# Patient Record
Sex: FEMALE | Race: WHITE | Employment: STUDENT
[De-identification: names, ages, dates, MRNs, and addresses within clinical notes are randomized per-mention and may not be internally consistent; named-entity substitution may affect disease eponyms.]

---

## 2018-08-03 ENCOUNTER — HOSPITAL ENCOUNTER (OUTPATIENT)
Dept: MRI IMAGING | Facility: CLINIC | Age: 18
Discharge: HOME | End: 2018-08-05
Payer: COMMERCIAL

## 2018-08-03 DIAGNOSIS — R35.0 FREQUENCY OF MICTURITION: ICD-10-CM

## 2018-08-03 DIAGNOSIS — R39.15 URGENCY OF URINATION: ICD-10-CM

## 2018-08-03 PROCEDURE — A9579 GAD-BASE MR CONTRAST NOS,1ML: HCPCS | Performed by: UROLOGY

## 2018-08-03 PROCEDURE — 72157 MRI CHEST SPINE W/O & W/DYE: CPT

## 2018-08-03 PROCEDURE — 70553 MRI BRAIN STEM W/O & W/DYE: CPT

## 2018-08-03 PROCEDURE — 6360000004 HC RX CONTRAST MEDICATION: Performed by: UROLOGY

## 2018-08-03 RX ADMIN — GADOTERIDOL 15 ML: 279.3 INJECTION, SOLUTION INTRAVENOUS at 15:22

## 2018-08-10 ENCOUNTER — HOSPITAL ENCOUNTER (OUTPATIENT)
Age: 18
Setting detail: OUTPATIENT SURGERY
Discharge: HOME | End: 2018-08-10
Attending: UROLOGY | Admitting: UROLOGY
Payer: COMMERCIAL

## 2018-08-10 VITALS
DIASTOLIC BLOOD PRESSURE: 56 MMHG | RESPIRATION RATE: 16 BRPM | SYSTOLIC BLOOD PRESSURE: 97 MMHG | OXYGEN SATURATION: 100 % | WEIGHT: 194 LBS | BODY MASS INDEX: 32.32 KG/M2 | HEART RATE: 78 BPM | HEIGHT: 65 IN | TEMPERATURE: 98.1 F

## 2018-08-10 PROCEDURE — 7100000040 HC SPAR PHASE II RECOVERY - FIRST 15 MIN: Performed by: UROLOGY

## 2018-08-10 PROCEDURE — 3600000003 HC SURGERY LEVEL 3 BASE: Performed by: UROLOGY

## 2018-08-10 PROCEDURE — 3600000013 HC SURGERY LEVEL 3 ADDTL 15MIN: Performed by: UROLOGY

## 2018-08-10 PROCEDURE — 2709999900 HC NON-CHARGEABLE SUPPLY: Performed by: UROLOGY

## 2018-08-10 PROCEDURE — C1758 CATHETER, URETERAL: HCPCS | Performed by: UROLOGY

## 2018-08-10 RX ORDER — ZOLPIDEM TARTRATE 10 MG/1
TABLET ORAL NIGHTLY PRN
COMMUNITY

## 2018-08-10 RX ORDER — BUPROPION HYDROCHLORIDE 150 MG/1
150 TABLET ORAL EVERY MORNING
COMMUNITY
End: 2019-04-09

## 2018-08-10 RX ORDER — PAROXETINE HYDROCHLORIDE 20 MG/1
20 TABLET, FILM COATED ORAL EVERY MORNING
COMMUNITY
End: 2019-04-09

## 2018-08-10 RX ORDER — DOCUSATE SODIUM 100 MG/1
100 CAPSULE, LIQUID FILLED ORAL DAILY
Qty: 30 CAPSULE | Refills: 11 | Status: ON HOLD | OUTPATIENT
Start: 2018-08-10 | End: 2019-05-02

## 2018-08-10 RX ORDER — FERROUS SULFATE 325(65) MG
325 TABLET ORAL 2 TIMES DAILY
COMMUNITY
End: 2019-04-09

## 2018-08-10 RX ORDER — OXYBUTYNIN CHLORIDE 10 MG/1
10 TABLET, EXTENDED RELEASE ORAL DAILY
Qty: 30 TABLET | Refills: 11 | Status: SHIPPED | OUTPATIENT
Start: 2018-08-10 | End: 2019-04-09

## 2018-08-10 RX ORDER — LITHIUM CARBONATE 150 MG/1
150 CAPSULE ORAL 2 TIMES DAILY WITH MEALS
COMMUNITY
End: 2019-04-09

## 2018-08-10 ASSESSMENT — PAIN DESCRIPTION - PAIN TYPE: TYPE: ACUTE PAIN

## 2018-08-10 ASSESSMENT — PAIN - FUNCTIONAL ASSESSMENT: PAIN_FUNCTIONAL_ASSESSMENT: 0-10

## 2018-08-10 NOTE — H&P
Concern    Not on file     Social History Narrative    No narrative on file       Family History:    Family History   Problem Relation Age of Onset    Other Mother         lime desease    Cancer Mother         skin    Depression Father     Stroke Father        REVIEW OF SYSTEMS:    Constitutional: negative  Eyes: negative  Respiratory: negative  Cardiovascular: negative  Gastrointestinal: negative  Genitourinary: see HPI  Musculoskeletal: negative  Skin: negative   Neurological: negative  Hematological/Lymphatic: negative  Psychological: negative    Physical Exam:      This a 21 y.o. female   Patient Vitals for the past 24 hrs:   Height Weight   08/10/18 1209 5' 5\" (1.651 m) 194 lb (88 kg)     Constitutional: Patient in no acute distress. Neuro: Alert and oriented to person, place and time. Psych: mood and affect normal  Lungs: Respiratory effort is normal  Cardiovascular: Normal peripheral pulses  Abdomen: Soft, non-tender, non-distended      LABS:   No results for input(s): WBC, HGB, HCT, MCV, PLT in the last 72 hours. No results for input(s): NA, K, CL, CO2, PHOS, BUN, CREATININE in the last 72 hours. Invalid input(s): CA    Additional Lab/culture results:    Urinalysis: No results for input(s): COLORU, PHUR, LABCAST, WBCUA, RBCUA, MUCUS, TRICHOMONAS, YEAST, BACTERIA, CLARITYU, SPECGRAV, LEUKOCYTESUR, UROBILINOGEN, Fatmata Budd in the last 72 hours. Invalid input(s): NITRATE, GLUCOSEUKETONESUAMORPHOUS     -----------------------------------------------------------------  Imaging Results:    Imaging was independently reviewed and confirmed with report. Assessment and Plan   Impression:      21year old female with urgency, frequency and urge incontinence of unknown etiology. She has no history of UTIs.     Plan:     Urodynamics  Cystoscopy

## 2018-09-21 ENCOUNTER — HOSPITAL ENCOUNTER (OUTPATIENT)
Age: 18
Discharge: HOME | End: 2018-09-21
Payer: COMMERCIAL

## 2018-09-21 LAB
ALBUMIN SERPL-MCNC: 4.2 G/DL (ref 3.5–5.2)
ALBUMIN/GLOBULIN RATIO: 1.2 (ref 1–2.5)
ALP BLD-CCNC: 140 U/L (ref 35–104)
ALT SERPL-CCNC: 12 U/L (ref 5–33)
ANION GAP SERPL CALCULATED.3IONS-SCNC: 15 MMOL/L (ref 9–17)
AST SERPL-CCNC: 20 U/L
BILIRUB SERPL-MCNC: 0.7 MG/DL (ref 0.3–1.2)
BUN BLDV-MCNC: 8 MG/DL (ref 6–20)
BUN/CREAT BLD: ABNORMAL (ref 9–20)
CALCIUM SERPL-MCNC: 9.3 MG/DL (ref 8.6–10.4)
CHLORIDE BLD-SCNC: 104 MMOL/L (ref 98–107)
CO2: 22 MMOL/L (ref 20–31)
CREAT SERPL-MCNC: 0.54 MG/DL (ref 0.5–0.9)
GFR AFRICAN AMERICAN: >60 ML/MIN
GFR NON-AFRICAN AMERICAN: >60 ML/MIN
GFR SERPL CREATININE-BSD FRML MDRD: ABNORMAL ML/MIN/{1.73_M2}
GFR SERPL CREATININE-BSD FRML MDRD: ABNORMAL ML/MIN/{1.73_M2}
GLUCOSE BLD-MCNC: 85 MG/DL (ref 70–99)
IRON SATURATION: 15 % (ref 20–55)
IRON: 55 UG/DL (ref 37–145)
LITHIUM DATE LAST DOSE: ABNORMAL
LITHIUM DOSE AMOUNT: ABNORMAL
LITHIUM DOSE TIME: ABNORMAL
LITHIUM LEVEL: 0.2 MMOL/L (ref 0.6–1.2)
POTASSIUM SERPL-SCNC: 3.8 MMOL/L (ref 3.7–5.3)
SODIUM BLD-SCNC: 141 MMOL/L (ref 135–144)
T3 FREE: 3.11 PG/ML (ref 2.02–4.43)
THYROXINE, FREE: 0.66 NG/DL (ref 0.93–1.7)
TOTAL IRON BINDING CAPACITY: 357 UG/DL (ref 250–450)
TOTAL PROTEIN: 7.6 G/DL (ref 6.4–8.3)
TSH SERPL DL<=0.05 MIU/L-ACNC: 2.09 MIU/L (ref 0.3–5)
UNSATURATED IRON BINDING CAPACITY: 302 UG/DL (ref 112–347)

## 2018-09-21 PROCEDURE — 84481 FREE ASSAY (FT-3): CPT

## 2018-09-21 PROCEDURE — 80178 ASSAY OF LITHIUM: CPT

## 2018-09-21 PROCEDURE — 36415 COLL VENOUS BLD VENIPUNCTURE: CPT

## 2018-09-21 PROCEDURE — 83550 IRON BINDING TEST: CPT

## 2018-09-21 PROCEDURE — 83540 ASSAY OF IRON: CPT

## 2018-09-21 PROCEDURE — 80053 COMPREHEN METABOLIC PANEL: CPT

## 2018-09-21 PROCEDURE — 84443 ASSAY THYROID STIM HORMONE: CPT

## 2018-09-21 PROCEDURE — 84439 ASSAY OF FREE THYROXINE: CPT

## 2018-11-05 ENCOUNTER — OFFICE VISIT (OUTPATIENT)
Dept: NEUROLOGY | Age: 18
End: 2018-11-05
Payer: COMMERCIAL

## 2018-11-05 VITALS
DIASTOLIC BLOOD PRESSURE: 67 MMHG | BODY MASS INDEX: 34.02 KG/M2 | SYSTOLIC BLOOD PRESSURE: 112 MMHG | HEIGHT: 65 IN | WEIGHT: 204.2 LBS | HEART RATE: 66 BPM

## 2018-11-05 DIAGNOSIS — N39.41 URGE INCONTINENCE: ICD-10-CM

## 2018-11-05 DIAGNOSIS — R20.0 NUMBNESS: Primary | ICD-10-CM

## 2018-11-05 PROCEDURE — 99204 OFFICE O/P NEW MOD 45 MIN: CPT | Performed by: NURSE PRACTITIONER

## 2018-11-05 NOTE — PROGRESS NOTES
Head:  Normocephalic, no signs of trauma   Eyes:  Conjunctiva/corneas clear;  eyelids intact   Ears:  Normal external ear and canals   Nose: Nares normal, mucosa normal, no drainage    Throat: Lips and tongue normal; teeth normal;  gums normal   Neck: Supple, intact flexion, extension and rotation;   trachea midline;  no adenopathy;   thyroid: not enlarged;   no carotid pulse abnormality   Back:   Symmetric, no curvature, ROM adequate   Lungs:   Respirations unlabored   Heart:  Regular rate and rhythm           Extremities: Extremities normal, no cyanosis, no edema   Pulses: Symmetric over head and neck   Skin: Skin color, texture normal, no rashes, no lesions             Neurological Examination    Neurologic Exam     Mental Status   Oriented to person, place, and time. Attention: normal. Concentration: normal.   Speech: speech is normal   Level of consciousness: alert  Normal comprehension. Cranial Nerves     CN II   Visual fields full to confrontation. CN III, IV, VI   Pupils are equal, round, and reactive to light. Extraocular motions are normal.   Right pupil: Shape: regular. Reactivity: brisk. Left pupil: Shape: regular. Reactivity: brisk. Nystagmus: none   Diplopia: none  Upgaze: normal  Downgaze: normal  Conjugate gaze: present    CN V   Facial sensation intact. CN VII   Facial expression full, symmetric. CN VIII   CN VIII normal.     CN IX, X   CN IX normal.     CN XI   CN XI normal.     CN XII   CN XII normal.     Motor Exam   Muscle bulk: normal  Overall muscle tone: normal  Right arm tone: normal  Left arm tone: normal  Right arm pronator drift: absent  Left arm pronator drift: absent  Right leg tone: normal  Left leg tone: normal    Strength   Strength 5/5 throughout.      Sensory Exam   Light touch normal.   Vibration normal.   Pinprick normal.       Distinct sensory level unable to be obtained over the spine with vibration and touch     Gait, Coordination, and Reflexes Gait  Gait: normal    Coordination   Finger to nose coordination: normal    Tremor   Resting tremor: absent    Reflexes   Right brachioradialis: 2+  Left brachioradialis: 2+  Right biceps: 2+  Left biceps: 2+  Right triceps: 2+  Left triceps: 2+  Right patellar: 2+  Left patellar: 2+  Right achilles: 2+  Left achilles: 2+  Right plantar: normal  Left plantar: normal            Assessment/Plan: :    Thank you very much for the kind referral of Inderjit Buitrago for evaluation of her neurologic problems. Because of the complexity of her symptoms including visual difficulties, sensory abnormalities, and urge incontinence, we will rule out demyelinative disease by obtaining an MRI of her cervical spine. The MRI of her brain and thoracic spine only showed disc disease at T10-11 and T11-12. We will also obtain an MRI of her lumbar spine to determine if there is any compressive lesion that may be causing her incontinence as well as neuropathic symptoms in her lower extremities. She has been evaluated by neuro-ophthalmology and we will obtain their records. Patient will return for reevaluation in approximately 3 weeks. If her testing is normal, we will consider EMG/NCV studies.   Once again, thank you for allowing us to participate in the neurologic care of Inderjit Buitrago    Signed: Marija Roth, NICOLASA

## 2018-11-05 NOTE — LETTER
is no significant weakness in her extremities. Patient recently had an evaluation by urology with the cystoscope, showing no significant abnormality. She also had an evaluation by neuro ophthalmology who found no abnormality. An MRI of her brain revealed a posterior left frontal venous angioma, without any other significant abnormalities. An MRI of her thoracic spine showed small central disc protrusion at T10-11 and disc bulging at T11-12. MRI images were reviewed with the patient and her mother during the visit. At one point, there was a question of demyelinative disease, which was ruled out in her brain and her thoracic spine.           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[facility])     Urinary frequency     Urinary urgency        Past Surgical History:   Procedure Laterality Date    [place] OFFICE/OUTPT VISIT,PROCEDURE ONLY N/A 8/10/2018    VIDEO Alexa Trevino, CYSTOSCOPY performed by Bacilio Coreas MD at [address] [place] [address] Drive TYMPANOSTOMY TUBE PLACEMENT Bilateral     WISDOM TOOTH EXTRACTION         Family History   Problem Relation Age of Onset    Other Mother         lime desease    Cancer Mother         skin    Depression Father     Stroke Father     Heart Disease Father     Hypertension Father     Cancer Paternal Uncle         Breast and lung cancer    Heart Disease Maternal Grandmother     Heart Disease Maternal Grandfather     Heart Disease Paternal Grandmother     Cancer Paternal Grandmother         cancer       Social History     Social History    Marital status: Single     Spouse name: N/A    Number of children: N/A    Years of education: N/A     Social History Main Topics    Smoking status: Never Smoker    Smokeless tobacco: Never Used    Alcohol use Yes      Comment: occasional    Drug use: No    Sexual activity: Not Asked     Other Topics Concern    None     Social History Narrative    None       Current Outpatient Prescriptions Left leg tone: normal    Strength   Strength 5/5 throughout. Sensory Exam   Light touch normal.   Vibration normal.   Pinprick normal.       Distinct sensory level unable to be obtained over the spine with vibration and touch     Gait, Coordination, and Reflexes     Gait  Gait: normal    Coordination   Finger to nose coordination: normal    Tremor   Resting tremor: absent    Reflexes   Right brachioradialis: 2+  Left brachioradialis: 2+  Right biceps: 2+  Left biceps: 2+  Right triceps: 2+  Left triceps: 2+  Right patellar: 2+  Left patellar: 2+  Right achilles: 2+  Left achilles: 2+  Right plantar: normal  Left plantar: normal            Assessment/Plan: :    Thank you very much for the kind referral of Divine Rinaldi for evaluation of her neurologic problems. Because of the complexity of her symptoms including visual difficulties, sensory abnormalities, and urge incontinence, we will rule out demyelinative disease by obtaining an MRI of her cervical spine. The MRI of her brain and thoracic spine only showed disc disease at T1011 and T11-12. We will also obtain an MRI of her lumbar spine to determine if there is any compressive lesion that may be causing her incontinence as well as neuropathic symptoms in her lower extremities. She has been evaluated by neuro-ophthalmology and we will obtain their records. Patient will return for reevaluation in approximately 3 weeks. If her testing is normal, we will consider EMG/NCV studies. Once again, thank you for allowing us to participate in the neurologic care of Divine Rinaldi    Signed: Janet Sanchez CNP          If you have questions, please do not hesitate to call me. I look forward to following Eduardo Gallegos along with you.     Sincerely,        ARTHUR Ornelas CNP

## 2018-11-06 ENCOUNTER — TELEPHONE (OUTPATIENT)
Dept: NEUROLOGY | Age: 18
End: 2018-11-06

## 2018-11-09 ENCOUNTER — HOSPITAL ENCOUNTER (OUTPATIENT)
Dept: MRI IMAGING | Facility: CLINIC | Age: 18
Discharge: HOME | End: 2018-11-11
Payer: COMMERCIAL

## 2018-11-09 DIAGNOSIS — N39.41 URGE INCONTINENCE: ICD-10-CM

## 2018-11-09 DIAGNOSIS — R20.0 NUMBNESS: ICD-10-CM

## 2018-11-09 PROCEDURE — 72141 MRI NECK SPINE W/O DYE: CPT

## 2018-11-09 PROCEDURE — 72148 MRI LUMBAR SPINE W/O DYE: CPT

## 2018-11-19 ENCOUNTER — HOSPITAL ENCOUNTER (EMERGENCY)
Age: 18
Discharge: HOME | End: 2018-11-19
Attending: EMERGENCY MEDICINE
Payer: COMMERCIAL

## 2018-11-19 VITALS
BODY MASS INDEX: 34.16 KG/M2 | HEART RATE: 82 BPM | OXYGEN SATURATION: 99 % | RESPIRATION RATE: 18 BRPM | DIASTOLIC BLOOD PRESSURE: 69 MMHG | SYSTOLIC BLOOD PRESSURE: 122 MMHG | HEIGHT: 65 IN | WEIGHT: 205 LBS | TEMPERATURE: 97.9 F

## 2018-11-19 DIAGNOSIS — R32 URINARY INCONTINENCE, UNSPECIFIED TYPE: Primary | ICD-10-CM

## 2018-11-19 DIAGNOSIS — F45.0 SOMATIZATION DISORDER: ICD-10-CM

## 2018-11-19 DIAGNOSIS — R20.0 NUMBNESS: ICD-10-CM

## 2018-11-19 LAB
-: NORMAL
ABSOLUTE EOS #: 0.2 K/UL (ref 0–0.4)
ABSOLUTE IMMATURE GRANULOCYTE: NORMAL K/UL (ref 0–0.3)
ABSOLUTE LYMPH #: 2.4 K/UL (ref 1.2–5.2)
ABSOLUTE MONO #: 0.6 K/UL (ref 0.1–1.3)
ALBUMIN SERPL-MCNC: 3.9 G/DL (ref 3.5–5.2)
ALBUMIN/GLOBULIN RATIO: ABNORMAL (ref 1–2.5)
ALP BLD-CCNC: 126 U/L (ref 35–104)
ALT SERPL-CCNC: 15 U/L (ref 5–33)
ANION GAP SERPL CALCULATED.3IONS-SCNC: 11 MMOL/L (ref 9–17)
AST SERPL-CCNC: 20 U/L
BASOPHILS # BLD: 1 % (ref 0–2)
BASOPHILS ABSOLUTE: 0.1 K/UL (ref 0–0.2)
BILIRUB SERPL-MCNC: 0.56 MG/DL (ref 0.3–1.2)
BILIRUBIN URINE: NEGATIVE
BUN BLDV-MCNC: 15 MG/DL (ref 6–20)
BUN/CREAT BLD: ABNORMAL (ref 9–20)
CALCIUM SERPL-MCNC: 9.5 MG/DL (ref 8.6–10.4)
CHLORIDE BLD-SCNC: 106 MMOL/L (ref 98–107)
CO2: 23 MMOL/L (ref 20–31)
COLOR: YELLOW
COMMENT UA: NORMAL
CREAT SERPL-MCNC: 0.44 MG/DL (ref 0.5–0.9)
DIFFERENTIAL TYPE: NORMAL
EOSINOPHILS RELATIVE PERCENT: 3 % (ref 0–4)
GFR AFRICAN AMERICAN: >60 ML/MIN
GFR NON-AFRICAN AMERICAN: >60 ML/MIN
GFR SERPL CREATININE-BSD FRML MDRD: ABNORMAL ML/MIN/{1.73_M2}
GFR SERPL CREATININE-BSD FRML MDRD: ABNORMAL ML/MIN/{1.73_M2}
GLUCOSE BLD-MCNC: 95 MG/DL (ref 70–99)
GLUCOSE URINE: NEGATIVE
HCG(URINE) PREGNANCY TEST: NEGATIVE
HCT VFR BLD CALC: 43.4 % (ref 36–46)
HEMOGLOBIN: 13.8 G/DL (ref 12–16)
IMMATURE GRANULOCYTES: NORMAL %
KETONES, URINE: NEGATIVE
LEUKOCYTE ESTERASE, URINE: NEGATIVE
LYMPHOCYTES # BLD: 28 % (ref 25–45)
MCH RBC QN AUTO: 28.5 PG (ref 26–34)
MCHC RBC AUTO-ENTMCNC: 31.8 G/DL (ref 31–37)
MCV RBC AUTO: 89.8 FL (ref 80–100)
MONOCYTES # BLD: 7 % (ref 2–8)
NITRITE, URINE: NEGATIVE
NRBC AUTOMATED: NORMAL PER 100 WBC
PDW BLD-RTO: 14.3 % (ref 11.5–14.9)
PH UA: 5 (ref 5–8)
PLATELET # BLD: 311 K/UL (ref 150–450)
PLATELET ESTIMATE: NORMAL
PMV BLD AUTO: 8.7 FL (ref 6–12)
POTASSIUM SERPL-SCNC: 4 MMOL/L (ref 3.7–5.3)
PROTEIN UA: NEGATIVE
RBC # BLD: 4.84 M/UL (ref 4–5.2)
RBC # BLD: NORMAL 10*6/UL
REASON FOR REJECTION: NORMAL
SEG NEUTROPHILS: 61 % (ref 34–64)
SEGMENTED NEUTROPHILS ABSOLUTE COUNT: 5.5 K/UL (ref 1.3–9.1)
SODIUM BLD-SCNC: 140 MMOL/L (ref 135–144)
SPECIFIC GRAVITY UA: 1.02 (ref 1–1.03)
TOTAL PROTEIN: 7.3 G/DL (ref 6.4–8.3)
TURBIDITY: CLEAR
URINE HGB: NEGATIVE
UROBILINOGEN, URINE: NORMAL
WBC # BLD: 8.9 K/UL (ref 4.5–13.5)
WBC # BLD: NORMAL 10*3/UL
ZZ NTE CLEAN UP: ORDERED TEST: NORMAL
ZZ NTE WITH NAME CLEAN UP: SPECIMEN SOURCE: NORMAL

## 2018-11-19 PROCEDURE — 36415 COLL VENOUS BLD VENIPUNCTURE: CPT

## 2018-11-19 PROCEDURE — 99284 EMERGENCY DEPT VISIT MOD MDM: CPT

## 2018-11-19 PROCEDURE — 80053 COMPREHEN METABOLIC PANEL: CPT

## 2018-11-19 PROCEDURE — 81003 URINALYSIS AUTO W/O SCOPE: CPT

## 2018-11-19 PROCEDURE — 84703 CHORIONIC GONADOTROPIN ASSAY: CPT

## 2018-11-19 PROCEDURE — 85025 COMPLETE CBC W/AUTO DIFF WBC: CPT

## 2018-11-19 ASSESSMENT — PAIN SCALES - GENERAL
PAINLEVEL_OUTOF10: 9
PAINLEVEL_OUTOF10: 0

## 2018-11-19 ASSESSMENT — PAIN DESCRIPTION - LOCATION: LOCATION: BACK

## 2018-11-19 ASSESSMENT — PAIN DESCRIPTION - DESCRIPTORS: DESCRIPTORS: ACHING

## 2018-11-19 ASSESSMENT — PAIN DESCRIPTION - PAIN TYPE: TYPE: CHRONIC PAIN

## 2018-11-19 NOTE — ED PROVIDER NOTES
eMERGENCY dEPARTMENT eNCOUnter    Pt Name: Delaney Barraza  MRN: [id number]  [place] 2000  Date of evaluation: 11/19/18  CHIEF COMPLAINT       Chief Complaint   Patient presents with    Back Pain     chronic- pt has neurologist    Numbness     bilat feet- onset    Incontinence     HISTORY OF PRESENT ILLNESS   HPI   The patient is a 17-year-old white female with a long history of depression and anxiety, who presents to the emergency room with multiple chronic complaints including back pain, urinary incontinence, and multiple neurologic symptoms. The patient has seen a neurologist, had MRIs of her back, seen a urologist, with cystoscopy performed, seen ophthalmology for visual complaints, etc., etc.  The patient was seeing her primary care provider today and complained of foot numbness. She was then sent to the emergency room. The patient has had bilateral foot numbness for several months. She is negative for ataxia. The mother also mentioned that the patient has been seen for occasional lymphadenopathy in her neck, but no biopsies have been performed. She is negative for dysuria, GI symptoms, URI symptoms, fever chills, chest pain, shortness of breath. Patient becomes tearful when discussing her symptoms. The mother states she has been worked up for multiple sclerosis, and that workup has been negative.     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

## 2018-12-14 ENCOUNTER — HOSPITAL ENCOUNTER (OUTPATIENT)
Dept: ULTRASOUND IMAGING | Age: 18
Discharge: HOME | End: 2018-12-16
Payer: COMMERCIAL

## 2018-12-14 DIAGNOSIS — R22.1 NECK MASS: ICD-10-CM

## 2018-12-14 PROCEDURE — 76536 US EXAM OF HEAD AND NECK: CPT

## 2019-01-10 ENCOUNTER — OFFICE VISIT (OUTPATIENT)
Dept: FAMILY MEDICINE CLINIC | Age: 19
End: 2019-01-10
Payer: COMMERCIAL

## 2019-01-10 VITALS
DIASTOLIC BLOOD PRESSURE: 62 MMHG | HEIGHT: 66 IN | RESPIRATION RATE: 16 BRPM | OXYGEN SATURATION: 99 % | SYSTOLIC BLOOD PRESSURE: 96 MMHG | TEMPERATURE: 98.4 F | WEIGHT: 209 LBS | HEART RATE: 80 BPM | BODY MASS INDEX: 33.59 KG/M2

## 2019-01-10 DIAGNOSIS — N39.41 URGE INCONTINENCE: Primary | ICD-10-CM

## 2019-01-10 DIAGNOSIS — E03.9 HYPOTHYROIDISM, UNSPECIFIED TYPE: ICD-10-CM

## 2019-01-10 DIAGNOSIS — G47.00 INSOMNIA, UNSPECIFIED TYPE: ICD-10-CM

## 2019-01-10 DIAGNOSIS — F33.2 SEVERE EPISODE OF RECURRENT MAJOR DEPRESSIVE DISORDER, WITHOUT PSYCHOTIC FEATURES (HCC): ICD-10-CM

## 2019-01-10 DIAGNOSIS — R05.9 COUGH: ICD-10-CM

## 2019-01-10 PROCEDURE — 99204 OFFICE O/P NEW MOD 45 MIN: CPT | Performed by: FAMILY MEDICINE

## 2019-01-10 RX ORDER — LITHIUM CARBONATE 450 MG
450 TABLET, EXTENDED RELEASE ORAL 2 TIMES DAILY
COMMUNITY

## 2019-01-10 RX ORDER — LEVOTHYROXINE AND LIOTHYRONINE 9.5; 2.25 UG/1; UG/1
15 TABLET ORAL DAILY
COMMUNITY
End: 2019-02-21 | Stop reason: SDUPTHER

## 2019-01-10 RX ORDER — ATOMOXETINE 40 MG/1
40 CAPSULE ORAL DAILY
COMMUNITY
End: 2019-02-21 | Stop reason: ALTCHOICE

## 2019-01-10 ASSESSMENT — ENCOUNTER SYMPTOMS
ABDOMINAL DISTENTION: 0
RHINORRHEA: 0
NAUSEA: 0
CONSTIPATION: 0
CHEST TIGHTNESS: 0
ABDOMINAL PAIN: 0
BACK PAIN: 0
SHORTNESS OF BREATH: 0
COUGH: 1
DIARRHEA: 0
VOMITING: 0
SORE THROAT: 0

## 2019-01-10 ASSESSMENT — PATIENT HEALTH QUESTIONNAIRE - PHQ9
SUM OF ALL RESPONSES TO PHQ9 QUESTIONS 1 & 2: 1
2. FEELING DOWN, DEPRESSED OR HOPELESS: 1
SUM OF ALL RESPONSES TO PHQ QUESTIONS 1-9: 1
1. LITTLE INTEREST OR PLEASURE IN DOING THINGS: 0
SUM OF ALL RESPONSES TO PHQ QUESTIONS 1-9: 1

## 2019-01-16 ENCOUNTER — HOSPITAL ENCOUNTER (OUTPATIENT)
Age: 19
Discharge: HOME | End: 2019-01-16
Payer: COMMERCIAL

## 2019-01-16 DIAGNOSIS — R05.9 COUGH: ICD-10-CM

## 2019-01-16 DIAGNOSIS — E03.9 HYPOTHYROIDISM, UNSPECIFIED TYPE: ICD-10-CM

## 2019-01-16 LAB
ALBUMIN SERPL-MCNC: 3.9 G/DL (ref 3.5–5.2)
ALBUMIN/GLOBULIN RATIO: 1.1 (ref 1–2.5)
ALP BLD-CCNC: 108 U/L (ref 35–104)
ALT SERPL-CCNC: 10 U/L (ref 5–33)
ANION GAP SERPL CALCULATED.3IONS-SCNC: 16 MMOL/L (ref 9–17)
AST SERPL-CCNC: 15 U/L
BILIRUB SERPL-MCNC: 1.14 MG/DL (ref 0.3–1.2)
BUN BLDV-MCNC: 10 MG/DL (ref 6–20)
BUN/CREAT BLD: ABNORMAL (ref 9–20)
CALCIUM SERPL-MCNC: 9.4 MG/DL (ref 8.6–10.4)
CHLORIDE BLD-SCNC: 104 MMOL/L (ref 98–107)
CO2: 20 MMOL/L (ref 20–31)
CREAT SERPL-MCNC: 0.56 MG/DL (ref 0.5–0.9)
GFR AFRICAN AMERICAN: >60 ML/MIN
GFR NON-AFRICAN AMERICAN: >60 ML/MIN
GFR SERPL CREATININE-BSD FRML MDRD: ABNORMAL ML/MIN/{1.73_M2}
GFR SERPL CREATININE-BSD FRML MDRD: ABNORMAL ML/MIN/{1.73_M2}
GLUCOSE BLD-MCNC: 80 MG/DL (ref 70–99)
HCT VFR BLD CALC: 43 % (ref 36.3–47.1)
HEMOGLOBIN: 12.9 G/DL (ref 11.9–15.1)
LITHIUM DATE LAST DOSE: NORMAL
LITHIUM DOSE AMOUNT: NORMAL
LITHIUM DOSE TIME: NORMAL
LITHIUM LEVEL: 0.9 MMOL/L (ref 0.6–1.2)
MCH RBC QN AUTO: 28.8 PG (ref 25.2–33.5)
MCHC RBC AUTO-ENTMCNC: 30 G/DL (ref 28.4–34.8)
MCV RBC AUTO: 96 FL (ref 82.6–102.9)
NRBC AUTOMATED: 0 PER 100 WBC
PDW BLD-RTO: 13.8 % (ref 11.8–14.4)
PLATELET # BLD: 323 K/UL (ref 138–453)
PMV BLD AUTO: 11.8 FL (ref 8.1–13.5)
POTASSIUM SERPL-SCNC: 4.3 MMOL/L (ref 3.7–5.3)
RBC # BLD: 4.48 M/UL (ref 3.95–5.11)
SODIUM BLD-SCNC: 140 MMOL/L (ref 135–144)
T3 FREE: 2.87 PG/ML (ref 2.02–4.43)
THYROXINE, FREE: 1.08 NG/DL (ref 0.93–1.7)
TOTAL PROTEIN: 7.3 G/DL (ref 6.4–8.3)
TSH SERPL DL<=0.05 MIU/L-ACNC: 4.96 MIU/L (ref 0.3–5)
WBC # BLD: 8.9 K/UL (ref 4.5–13.5)

## 2019-01-16 PROCEDURE — 84443 ASSAY THYROID STIM HORMONE: CPT

## 2019-01-16 PROCEDURE — 84439 ASSAY OF FREE THYROXINE: CPT

## 2019-01-16 PROCEDURE — 85027 COMPLETE CBC AUTOMATED: CPT

## 2019-01-16 PROCEDURE — 84481 FREE ASSAY (FT-3): CPT

## 2019-01-16 PROCEDURE — 80053 COMPREHEN METABOLIC PANEL: CPT

## 2019-01-16 PROCEDURE — 80178 ASSAY OF LITHIUM: CPT

## 2019-01-16 PROCEDURE — 36415 COLL VENOUS BLD VENIPUNCTURE: CPT

## 2019-02-14 ENCOUNTER — HOSPITAL ENCOUNTER (OUTPATIENT)
Dept: GENERAL RADIOLOGY | Facility: CLINIC | Age: 19
Discharge: HOME | End: 2019-02-16
Payer: COMMERCIAL

## 2019-02-14 ENCOUNTER — HOSPITAL ENCOUNTER (OUTPATIENT)
Facility: CLINIC | Age: 19
Discharge: HOME | End: 2019-02-16
Payer: COMMERCIAL

## 2019-02-14 DIAGNOSIS — R05.9 COUGH: ICD-10-CM

## 2019-02-14 PROCEDURE — 71046 X-RAY EXAM CHEST 2 VIEWS: CPT

## 2019-02-21 ENCOUNTER — OFFICE VISIT (OUTPATIENT)
Dept: FAMILY MEDICINE CLINIC | Age: 19
End: 2019-02-21
Payer: COMMERCIAL

## 2019-02-21 VITALS
BODY MASS INDEX: 34.74 KG/M2 | TEMPERATURE: 98.2 F | HEART RATE: 96 BPM | WEIGHT: 212 LBS | OXYGEN SATURATION: 98 % | SYSTOLIC BLOOD PRESSURE: 102 MMHG | RESPIRATION RATE: 16 BRPM | DIASTOLIC BLOOD PRESSURE: 64 MMHG

## 2019-02-21 DIAGNOSIS — F33.2 SEVERE EPISODE OF RECURRENT MAJOR DEPRESSIVE DISORDER, WITHOUT PSYCHOTIC FEATURES (HCC): ICD-10-CM

## 2019-02-21 DIAGNOSIS — G47.00 INSOMNIA, UNSPECIFIED TYPE: ICD-10-CM

## 2019-02-21 DIAGNOSIS — R05.9 COUGH: Primary | ICD-10-CM

## 2019-02-21 DIAGNOSIS — E03.9 HYPOTHYROIDISM, UNSPECIFIED TYPE: ICD-10-CM

## 2019-02-21 DIAGNOSIS — N39.41 URGE INCONTINENCE: ICD-10-CM

## 2019-02-21 PROBLEM — R20.0 NUMBNESS: Status: RESOLVED | Noted: 2018-11-05 | Resolved: 2019-02-21

## 2019-02-21 PROCEDURE — 99213 OFFICE O/P EST LOW 20 MIN: CPT | Performed by: FAMILY MEDICINE

## 2019-02-21 RX ORDER — LEVOTHYROXINE AND LIOTHYRONINE 9.5; 2.25 UG/1; UG/1
15 TABLET ORAL DAILY
Qty: 30 TABLET | Refills: 11 | Status: SHIPPED | OUTPATIENT
Start: 2019-02-21 | End: 2020-01-08 | Stop reason: SDUPTHER

## 2019-02-21 RX ORDER — METHYLPHENIDATE HYDROCHLORIDE 36 MG/1
1 TABLET ORAL DAILY
Refills: 0 | COMMUNITY
Start: 2019-01-24

## 2019-02-21 ASSESSMENT — ENCOUNTER SYMPTOMS
DIARRHEA: 0
BACK PAIN: 0
RHINORRHEA: 0
ABDOMINAL DISTENTION: 0
CHEST TIGHTNESS: 0
COUGH: 1
VOMITING: 0
NAUSEA: 0
SORE THROAT: 0
CONSTIPATION: 0
SHORTNESS OF BREATH: 0
ABDOMINAL PAIN: 0

## 2019-02-26 ENCOUNTER — OFFICE VISIT (OUTPATIENT)
Dept: FAMILY MEDICINE CLINIC | Age: 19
End: 2019-02-26
Payer: COMMERCIAL

## 2019-02-26 VITALS
TEMPERATURE: 97.1 F | RESPIRATION RATE: 16 BRPM | SYSTOLIC BLOOD PRESSURE: 112 MMHG | DIASTOLIC BLOOD PRESSURE: 68 MMHG | WEIGHT: 210 LBS | BODY MASS INDEX: 34.41 KG/M2 | HEART RATE: 66 BPM | OXYGEN SATURATION: 99 %

## 2019-02-26 DIAGNOSIS — L30.9 DERMATITIS: Primary | ICD-10-CM

## 2019-02-26 PROCEDURE — 99213 OFFICE O/P EST LOW 20 MIN: CPT | Performed by: FAMILY MEDICINE

## 2019-02-26 RX ORDER — TRIAMCINOLONE ACETONIDE 1 MG/G
CREAM TOPICAL 2 TIMES DAILY
Qty: 45 G | Refills: 0 | Status: SHIPPED | OUTPATIENT
Start: 2019-02-26 | End: 2019-04-09

## 2019-02-26 ASSESSMENT — ENCOUNTER SYMPTOMS
SORE THROAT: 0
RHINORRHEA: 0
CHEST TIGHTNESS: 0
DIARRHEA: 0
VOMITING: 0
SHORTNESS OF BREATH: 0
ABDOMINAL DISTENTION: 0
CONSTIPATION: 0
ABDOMINAL PAIN: 0
COUGH: 0
NAUSEA: 0
BACK PAIN: 0

## 2019-03-04 ENCOUNTER — TELEPHONE (OUTPATIENT)
Dept: FAMILY MEDICINE CLINIC | Age: 19
End: 2019-03-04

## 2019-03-18 ENCOUNTER — HOSPITAL ENCOUNTER (OUTPATIENT)
Age: 19
Discharge: HOME | End: 2019-03-20
Payer: COMMERCIAL

## 2019-03-18 ENCOUNTER — HOSPITAL ENCOUNTER (OUTPATIENT)
Dept: GENERAL RADIOLOGY | Age: 19
Discharge: HOME | End: 2019-03-20
Payer: COMMERCIAL

## 2019-03-18 DIAGNOSIS — Z30.46 CHECKING SUBDERMAL CONTRACEPTIVE: ICD-10-CM

## 2019-03-18 PROCEDURE — 73060 X-RAY EXAM OF HUMERUS: CPT

## 2019-04-02 ENCOUNTER — TELEPHONE (OUTPATIENT)
Dept: FAMILY MEDICINE CLINIC | Age: 19
End: 2019-04-02

## 2019-04-02 NOTE — TELEPHONE ENCOUNTER
Patient's mom called in asking if results from the pulmonary test had been received as patient or her have not heard anything back yet. Please call Ignacia back to advise. Thank You.

## 2019-04-09 ENCOUNTER — TELEPHONE (OUTPATIENT)
Dept: OBGYN CLINIC | Age: 19
End: 2019-04-09

## 2019-04-09 ENCOUNTER — OFFICE VISIT (OUTPATIENT)
Dept: OBGYN CLINIC | Age: 19
End: 2019-04-09
Payer: COMMERCIAL

## 2019-04-09 VITALS
SYSTOLIC BLOOD PRESSURE: 125 MMHG | HEIGHT: 65 IN | HEART RATE: 80 BPM | DIASTOLIC BLOOD PRESSURE: 62 MMHG | BODY MASS INDEX: 35.65 KG/M2 | WEIGHT: 214 LBS

## 2019-04-09 DIAGNOSIS — N92.0 MENORRHAGIA WITH REGULAR CYCLE: Primary | ICD-10-CM

## 2019-04-09 DIAGNOSIS — Z30.017 NEXPLANON INSERTION: ICD-10-CM

## 2019-04-09 DIAGNOSIS — Z30.46 NEXPLANON REMOVAL: ICD-10-CM

## 2019-04-09 PROCEDURE — 11983 REMOVE/INSERT DRUG IMPLANT: CPT | Performed by: OBSTETRICS & GYNECOLOGY

## 2019-04-09 PROCEDURE — 99203 OFFICE O/P NEW LOW 30 MIN: CPT | Performed by: OBSTETRICS & GYNECOLOGY

## 2019-04-09 ASSESSMENT — ENCOUNTER SYMPTOMS
BACK PAIN: 0
COUGH: 0
ABDOMINAL PAIN: 0
SHORTNESS OF BREATH: 0
CONSTIPATION: 0

## 2019-04-09 NOTE — PROGRESS NOTES
[facility]YSIC[facility]ANS  [facility] - [address] [place] [address]  Dept: [phone]  Dept Fax: [phone]    19    Chief Complaint   Patient presents with    Establish Care    Procedure     Nexplanon removal and insertion of new one       Rosanne Cortés 18 y.o. is here for Nexplanon removal and an insertion of a new one. She was seen by her PCP who was unable to find the Nexplanon and so referred her here. She had the Nexplanon placed 2016. She says that she wasn't having any periods for the first 2 years, but this last year she has been having monthly periods. At first they were really light, but have become heavier. She was initially started on the Nexplanon for heavy periods with lots of clotting. She is sexually active and uses barrier protection as well. Review of Systems   Constitutional: Negative for chills and fever. HENT: Negative for congestion and hearing loss. Respiratory: Negative for cough and shortness of breath. Cardiovascular: Negative for chest pain and palpitations. Gastrointestinal: Negative for abdominal pain and constipation. Endocrine: Negative for cold intolerance and heat intolerance. Genitourinary: Negative for dyspareunia and vaginal discharge. Musculoskeletal: Negative for back pain. Neurological: Negative for dizziness and light-headedness. Psychiatric/Behavioral: The patient is not nervous/anxious. Gynecologic History  No LMP recorded.   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 Per Dr. Greg Fothergill. 0    thyroid (NP THYROID) 15 MG tablet Take 1 tablet by mouth daily 30 tablet 11    Mirabegron ER (MYRBETRIQ) 50 MG TB24 Take 1 tablet by mouth daily      lithium (ESKALITH) 450 MG extended release tablet Take 450 mg by mouth 2 times daily      etonogestrel (NEXPLANON) 68 MG implant 68 mg by Subdermal route once      zolpidem (AMBIEN) 10 MG tablet Take by mouth nightly as needed for Sleep. [facility] NONFORMULARY 1 Dose by Implant route      docusate sodium (COLACE) 100 MG capsule Take 1 capsule by mouth daily 30 capsule 11     No current facility-administered medications for this visit.       Social History     Socioeconomic History    Marital status: Single     Spouse name: Not on file    Number of children: Not on file    Years of education: Not on file    Highest education level: Not on file   Occupational History    Not on file   Social Needs    Financial resource strain: Not on file    Food insecurity:     Worry: Not on file     Inability: Not on file    Transportation needs:     Medical: Not on file     Non-medical: Not on file   Tobacco Use    Smoking status: Never Smoker    Smokeless tobacco: Never Used   Substance and Sexual Activity    Alcohol use: Yes     Comment: occasional    Drug use: No    Sexual activity: Yes     Partners: Male   Lifestyle    Physical activity:     Days per week: Not on file     Minutes per session: Not on file    Stress: Not on file   Relationships    Social connections:     Talks on phone: Not on file     Gets together: Not on file     Attends [employer] service: Not on file     Active member of club or organization: Not on file     Attends meetings of clubs or organizations: Not on file Relationship status: Not on file    Intimate partner violence:     Fear of current or ex partner: Not on file     Emotionally abused: Not on file     Physically abused: Not on file     Forced sexual activity: Not on file   Other Topics Concern    Not on file   Social History Narrative    Not on file     Family History   Problem Relation Age of Onset    Other Mother         lime desease    Cancer Mother         skin    Depression Father     Stroke Father     Heart Disease Father     Hypertension Father     Heart Disease Maternal Grandmother     Breast Cancer Maternal Grandmother     Lung Cancer Maternal Grandmother     Heart Disease Maternal Grandfather     Heart Disease Paternal Grandmother     Cancer Paternal Grandmother         cancer       Physical exam Physical Exam   Constitutional: She is oriented to person, place, and time. She appears well-developed and well-nourished. HENT:   Head: Normocephalic and atraumatic. Eyes: Pupils are equal, round, and reactive to light. EOM are normal.   Neck: Normal range of motion. No tracheal deviation present. Neurological: She is alert and oriented to person, place, and time. Skin: Skin is warm and dry. Psychiatric: She has a normal mood and affect. Her behavior is normal. Judgment and thought content normal.       Assessment/Plan  1. Menorrhagia with regular cycle  - Pt has Nexplanon in place and likes that it makes her periods lighter and for awhile made her have amenorrhea. Pt would like Nexplanon removed and a new one inserted. PCP attempted to find Nexplanon in arm and was unable to palpate, so had to have an Xray done that visualized it. 2. Nexplanon insertion  - Able to place new Nexplanon    3. Nexplanon removal  - Unable to remove Nexplanon in the office. Discussed this with pt and will schedule pt for removal in the OR under Xray guidance with the C arm at [facility]. Discussed risks of surgery in normal detailed fashion.       Patient was seen with total face to face time of 30 minutes. More than 50% of this visit was on counseling and education regarding her    Diagnosis Orders   1. Menorrhagia with regular cycle     2. Nexplanon insertion     3. Nexplanon removal      and her options. She was also counseled on her preventative health maintenance recommendations and follow-up.     Eduardo Harper MD  [address]

## 2019-04-19 ENCOUNTER — TELEPHONE (OUTPATIENT)
Dept: OBGYN CLINIC | Age: 19
End: 2019-04-19

## 2019-04-19 NOTE — TELEPHONE ENCOUNTER
Severiano Sage with pre-certification at [facility] called stating that due to [facility] she will not be able to have her surgery at that facility. She will need to have her surgery at a [facility]. Severiano Sage said if there are any questions you can call her.

## 2019-04-22 NOTE — TELEPHONE ENCOUNTER
Surgery is now scheduled for [facility] on 5/2/2019 at 10:25 am, pt to arrive [facility] at 8:30am, NPO as stated before, pt to call with any questions

## 2019-05-02 ENCOUNTER — APPOINTMENT (OUTPATIENT)
Dept: GENERAL RADIOLOGY | Age: 19
End: 2019-05-02
Attending: OBSTETRICS & GYNECOLOGY
Payer: COMMERCIAL

## 2019-05-02 ENCOUNTER — HOSPITAL ENCOUNTER (OUTPATIENT)
Age: 19
Setting detail: OUTPATIENT SURGERY
Discharge: HOME | End: 2019-05-02
Attending: OBSTETRICS & GYNECOLOGY | Admitting: OBSTETRICS & GYNECOLOGY
Payer: COMMERCIAL

## 2019-05-02 ENCOUNTER — ANESTHESIA EVENT (OUTPATIENT)
Dept: OPERATING ROOM | Age: 19
End: 2019-05-02
Payer: COMMERCIAL

## 2019-05-02 ENCOUNTER — ANESTHESIA (OUTPATIENT)
Dept: OPERATING ROOM | Age: 19
End: 2019-05-02
Payer: COMMERCIAL

## 2019-05-02 VITALS
HEIGHT: 65 IN | OXYGEN SATURATION: 99 % | BODY MASS INDEX: 34.56 KG/M2 | SYSTOLIC BLOOD PRESSURE: 105 MMHG | HEART RATE: 65 BPM | WEIGHT: 207.45 LBS | RESPIRATION RATE: 18 BRPM | TEMPERATURE: 97 F | DIASTOLIC BLOOD PRESSURE: 61 MMHG

## 2019-05-02 VITALS — OXYGEN SATURATION: 100 % | SYSTOLIC BLOOD PRESSURE: 93 MMHG | DIASTOLIC BLOOD PRESSURE: 43 MMHG

## 2019-05-02 PROBLEM — Z30.46 NEXPLANON REMOVAL: Status: ACTIVE | Noted: 2019-05-02

## 2019-05-02 LAB
ABO/RH: NORMAL
ANTIBODY SCREEN: NEGATIVE
ARM BAND NUMBER: NORMAL
EXPIRATION DATE: NORMAL
HCG, PREGNANCY URINE (POC): NEGATIVE
HCT VFR BLD CALC: 43.1 % (ref 36.3–47.1)
HEMOGLOBIN: 13.5 G/DL (ref 11.9–15.1)
MCH RBC QN AUTO: 28.9 PG (ref 25.2–33.5)
MCHC RBC AUTO-ENTMCNC: 31.3 G/DL (ref 28.4–34.8)
MCV RBC AUTO: 92.3 FL (ref 82.6–102.9)
NRBC AUTOMATED: 0 PER 100 WBC
PDW BLD-RTO: 12.9 % (ref 11.8–14.4)
PLATELET # BLD: 317 K/UL (ref 138–453)
PMV BLD AUTO: 10.9 FL (ref 8.1–13.5)
RBC # BLD: 4.67 M/UL (ref 3.95–5.11)
WBC # BLD: 9.9 K/UL (ref 4.5–13.5)

## 2019-05-02 PROCEDURE — 86900 BLOOD TYPING SEROLOGIC ABO: CPT

## 2019-05-02 PROCEDURE — 7100000040 HC SPAR PHASE II RECOVERY - FIRST 15 MIN: Performed by: OBSTETRICS & GYNECOLOGY

## 2019-05-02 PROCEDURE — 3700000001 HC ADD 15 MINUTES (ANESTHESIA): Performed by: OBSTETRICS & GYNECOLOGY

## 2019-05-02 PROCEDURE — 3209999900 FLUORO FOR SURGICAL PROCEDURES

## 2019-05-02 PROCEDURE — 6360000002 HC RX W HCPCS: Performed by: NURSE ANESTHETIST, CERTIFIED REGISTERED

## 2019-05-02 PROCEDURE — 11982 REMOVE DRUG IMPLANT DEVICE: CPT | Performed by: OBSTETRICS & GYNECOLOGY

## 2019-05-02 PROCEDURE — 84703 CHORIONIC GONADOTROPIN ASSAY: CPT

## 2019-05-02 PROCEDURE — 85027 COMPLETE CBC AUTOMATED: CPT

## 2019-05-02 PROCEDURE — 3700000000 HC ANESTHESIA ATTENDED CARE: Performed by: OBSTETRICS & GYNECOLOGY

## 2019-05-02 PROCEDURE — 2500000003 HC RX 250 WO HCPCS: Performed by: OBSTETRICS & GYNECOLOGY

## 2019-05-02 PROCEDURE — 3600000003 HC SURGERY LEVEL 3 BASE: Performed by: OBSTETRICS & GYNECOLOGY

## 2019-05-02 PROCEDURE — 86850 RBC ANTIBODY SCREEN: CPT

## 2019-05-02 PROCEDURE — 7100000041 HC SPAR PHASE II RECOVERY - ADDTL 15 MIN: Performed by: OBSTETRICS & GYNECOLOGY

## 2019-05-02 PROCEDURE — 2500000003 HC RX 250 WO HCPCS: Performed by: NURSE ANESTHETIST, CERTIFIED REGISTERED

## 2019-05-02 PROCEDURE — 2580000003 HC RX 258: Performed by: ANESTHESIOLOGY

## 2019-05-02 PROCEDURE — 86901 BLOOD TYPING SEROLOGIC RH(D): CPT

## 2019-05-02 PROCEDURE — 2709999900 HC NON-CHARGEABLE SUPPLY: Performed by: OBSTETRICS & GYNECOLOGY

## 2019-05-02 PROCEDURE — 6370000000 HC RX 637 (ALT 250 FOR IP): Performed by: OBSTETRICS & GYNECOLOGY

## 2019-05-02 PROCEDURE — 2580000003 HC RX 258: Performed by: OBSTETRICS & GYNECOLOGY

## 2019-05-02 PROCEDURE — 3600000013 HC SURGERY LEVEL 3 ADDTL 15MIN: Performed by: OBSTETRICS & GYNECOLOGY

## 2019-05-02 RX ORDER — LIDOCAINE HYDROCHLORIDE 10 MG/ML
INJECTION, SOLUTION EPIDURAL; INFILTRATION; INTRACAUDAL; PERINEURAL PRN
Status: DISCONTINUED | OUTPATIENT
Start: 2019-05-02 | End: 2019-05-02 | Stop reason: SDUPTHER

## 2019-05-02 RX ORDER — PROPOFOL 10 MG/ML
INJECTION, EMULSION INTRAVENOUS CONTINUOUS PRN
Status: DISCONTINUED | OUTPATIENT
Start: 2019-05-02 | End: 2019-05-02 | Stop reason: SDUPTHER

## 2019-05-02 RX ORDER — PROPOFOL 10 MG/ML
INJECTION, EMULSION INTRAVENOUS PRN
Status: DISCONTINUED | OUTPATIENT
Start: 2019-05-02 | End: 2019-05-02 | Stop reason: SDUPTHER

## 2019-05-02 RX ORDER — SODIUM CHLORIDE 0.9 % (FLUSH) 0.9 %
10 SYRINGE (ML) INJECTION EVERY 12 HOURS SCHEDULED
Status: DISCONTINUED | OUTPATIENT
Start: 2019-05-02 | End: 2019-05-02 | Stop reason: HOSPADM

## 2019-05-02 RX ORDER — SODIUM CHLORIDE 0.9 % (FLUSH) 0.9 %
10 SYRINGE (ML) INJECTION PRN
Status: DISCONTINUED | OUTPATIENT
Start: 2019-05-02 | End: 2019-05-02 | Stop reason: HOSPADM

## 2019-05-02 RX ORDER — SODIUM CHLORIDE, SODIUM LACTATE, POTASSIUM CHLORIDE, CALCIUM CHLORIDE 600; 310; 30; 20 MG/100ML; MG/100ML; MG/100ML; MG/100ML
INJECTION, SOLUTION INTRAVENOUS CONTINUOUS
Status: DISCONTINUED | OUTPATIENT
Start: 2019-05-02 | End: 2019-05-02 | Stop reason: HOSPADM

## 2019-05-02 RX ORDER — FENTANYL CITRATE 50 UG/ML
25 INJECTION, SOLUTION INTRAMUSCULAR; INTRAVENOUS EVERY 5 MIN PRN
Status: DISCONTINUED | OUTPATIENT
Start: 2019-05-02 | End: 2019-05-02 | Stop reason: HOSPADM

## 2019-05-02 RX ORDER — FENTANYL CITRATE 50 UG/ML
INJECTION, SOLUTION INTRAMUSCULAR; INTRAVENOUS PRN
Status: DISCONTINUED | OUTPATIENT
Start: 2019-05-02 | End: 2019-05-02 | Stop reason: SDUPTHER

## 2019-05-02 RX ORDER — FENTANYL CITRATE 50 UG/ML
50 INJECTION, SOLUTION INTRAMUSCULAR; INTRAVENOUS EVERY 5 MIN PRN
Status: DISCONTINUED | OUTPATIENT
Start: 2019-05-02 | End: 2019-05-02 | Stop reason: HOSPADM

## 2019-05-02 RX ORDER — DESVENLAFAXINE 50 MG/1
TABLET, EXTENDED RELEASE ORAL
Refills: 4 | COMMUNITY
Start: 2019-04-12

## 2019-05-02 RX ORDER — MIDAZOLAM HYDROCHLORIDE 1 MG/ML
INJECTION INTRAMUSCULAR; INTRAVENOUS PRN
Status: DISCONTINUED | OUTPATIENT
Start: 2019-05-02 | End: 2019-05-02 | Stop reason: SDUPTHER

## 2019-05-02 RX ORDER — BUPIVACAINE HYDROCHLORIDE AND EPINEPHRINE 5; 5 MG/ML; UG/ML
INJECTION, SOLUTION EPIDURAL; INTRACAUDAL; PERINEURAL PRN
Status: DISCONTINUED | OUTPATIENT
Start: 2019-05-02 | End: 2019-05-02 | Stop reason: ALTCHOICE

## 2019-05-02 RX ORDER — MAGNESIUM HYDROXIDE 1200 MG/15ML
LIQUID ORAL CONTINUOUS PRN
Status: COMPLETED | OUTPATIENT
Start: 2019-05-02 | End: 2019-05-02

## 2019-05-02 RX ORDER — SCOLOPAMINE TRANSDERMAL SYSTEM 1 MG/1
1 PATCH, EXTENDED RELEASE TRANSDERMAL ONCE
Status: DISCONTINUED | OUTPATIENT
Start: 2019-05-02 | End: 2019-05-02 | Stop reason: HOSPADM

## 2019-05-02 RX ADMIN — PROPOFOL 125 MCG/KG/MIN: 10 INJECTION, EMULSION INTRAVENOUS at 10:40

## 2019-05-02 RX ADMIN — PROPOFOL 30 MG: 10 INJECTION, EMULSION INTRAVENOUS at 10:53

## 2019-05-02 RX ADMIN — PROPOFOL 30 MG: 10 INJECTION, EMULSION INTRAVENOUS at 10:47

## 2019-05-02 RX ADMIN — SODIUM CHLORIDE, POTASSIUM CHLORIDE, SODIUM LACTATE AND CALCIUM CHLORIDE: 600; 310; 30; 20 INJECTION, SOLUTION INTRAVENOUS at 10:00

## 2019-05-02 RX ADMIN — PROPOFOL 50 MG: 10 INJECTION, EMULSION INTRAVENOUS at 10:44

## 2019-05-02 RX ADMIN — FENTANYL CITRATE 25 MCG: 50 INJECTION INTRAMUSCULAR; INTRAVENOUS at 10:41

## 2019-05-02 RX ADMIN — PROPOFOL 50 MG: 10 INJECTION, EMULSION INTRAVENOUS at 10:41

## 2019-05-02 RX ADMIN — MIDAZOLAM HYDROCHLORIDE 2 MG: 1 INJECTION, SOLUTION INTRAMUSCULAR; INTRAVENOUS at 10:39

## 2019-05-02 RX ADMIN — PROPOFOL 50 MG: 10 INJECTION, EMULSION INTRAVENOUS at 10:50

## 2019-05-02 RX ADMIN — FENTANYL CITRATE 50 MCG: 50 INJECTION INTRAMUSCULAR; INTRAVENOUS at 11:34

## 2019-05-02 RX ADMIN — LIDOCAINE HYDROCHLORIDE 50 MG: 10 INJECTION, SOLUTION EPIDURAL; INFILTRATION; INTRACAUDAL; PERINEURAL at 10:41

## 2019-05-02 ASSESSMENT — PULMONARY FUNCTION TESTS
PIF_VALUE: 1

## 2019-05-02 ASSESSMENT — PAIN SCALES - GENERAL
PAINLEVEL_OUTOF10: 0

## 2019-05-02 ASSESSMENT — PAIN - FUNCTIONAL ASSESSMENT: PAIN_FUNCTIONAL_ASSESSMENT: 0-10

## 2019-05-02 NOTE — ANESTHESIA POSTPROCEDURE EVALUATION
Department of Anesthesiology  Postprocedure Note    Patient: Keli Menjivar  MRN: [id number]  YOB: 2000  Date of evaluation: 5/2/2019  Time:  11:39 AM     Procedure Summary     Date:  05/02/19 Room / Location:  [facility] OR  / [facility] OR    Anesthesia Start:  1036 Anesthesia Stop:  1135    Procedure:  ATTEMPTED EXCISION NEXPLANON FROM ARM  C-ARM  (DR YOUSSEF TO ASSIST) (Left ) Diagnosis:  (MENORRHAGIA)    Surgeon:  Clifton Henry MD Responsible Provider:  Jason Munoz MD    Anesthesia Type:  MAC ASA Status:  2          Anesthesia Type: MAC    Kendrick Phase I:      Kendrick Phase II: Kendrick Score: 9    Last vitals: Reviewed and per EMR flowsheets.        Anesthesia Post Evaluation    Patient location during evaluation: PACU  Patient participation: complete - patient participated  Level of consciousness: awake and alert  Pain score: 2  Airway patency: patent  Nausea & Vomiting: no nausea and no vomiting  Complications: no  Cardiovascular status: hemodynamically stable  Respiratory status: acceptable, nasal cannula and room air  Hydration status: stable

## 2019-05-02 NOTE — OP NOTE
Operative Note  Department of Obstetrics and Gynecology  [facility]       Patient: Darren Arzate   : 2000  MRN: [id number]       Acct: [de-identified]   PCP: Ezekiel Singh MD  Date of Procedure: 19    Pre-operative Diagnosis: 19 y.o. female [place]. Unsuccessful Nexplanon removal in offive    Post-operative Diagnosis: Unsuccessful Nexplanon removal    Procedure: Attempted Nexplanon removal under Xray guidance    Surgeon: Renan Juarez MD    Assistants: Rosalie Harris DO; Maurie Castleman, DO PGY-3    Anesthesia: Gentle sedation    Indications: Patient had a Nexplanon placed 2016 for heavy periods. She presented to the office for removal of the device and the attempt was unsuccessful due to the deep nature of the device in the arm. The patient was brought to the hospital to attempt removal with the use of XRay and sedation. Procedure Details: The patient was seen in the pre-op room. The risks, benefits, complications, treatment options, and expected outcomes were discussed with the patient. The patient concurred with the proposed plan, giving informed consent. The patient was taken to the Operating Room and identified as Darren Arzate and the procedure was verified. A Time Out was held and the above information confirmed. After administration of gentle sedation, the patient was placed in the dorsolithotomy. The patient was prepped and draped in the usual sterile fashion. The Nexplanon was identified with the C-arm Xray machine and a 0.5 cm incision was made with a scalpel in the skin of the forearm. The device was noted to be near the humerus and approximately 3 cm deep to the skin. Multiple attempts with hemostats and forceps were made to grasp the device.   Due to the deep location of the device and it's close proximity to muscle and bone the decision was made to abort the procedure as more harm could be done to the patient's arm than good if more attempts were made to remove the device. The skin was then closed with skin glue. Instrument, sponge, and needle counts were correct at the conclusion of the case.         Estimated Blood Loss: Minimalml  Drains:  None  Total IV Fluids: Minimalml  Urine output: Patient voided prior to procedure  Specimens:  none  Instrument and Sponge Count: Correct x2  Complications: none  Condition: stable, transfer to post anesthesia recovery    Shannon Navarrete MD  5/2/2019, 11:37 AM

## 2019-05-02 NOTE — H&P
OB/GYN Pre-Op H&P  [facility]    Patient Name: Doris Brandon     Patient : 2000  [facility]  Admission Date/Time: 2019  8:16 AM  Primary Care Physician: Jony Marquis MD  MRN: [id number]    Date: 2019  Time: 10:05 AM    The patient was seen in pre-op holding. She is here for scheduled surgery: Removal of Nexplanon. Attempt was made to remove Nexplanon (left upper extremity) however unsuccessful in outpatient setting. A Nexplanon was placed on 19 for Menorrhagia. The procedure risks and complications were reviewed. The labs, Consent were reviewed and updated. The patient was counseled on the possibility of  the need of a second surgery. The patient voiced understanding and had all of her questions answered. The possibility of incomplete removal of abnormal tissue was discussed. OBSTETRICAL HISTORY:   OB History    Para Term  AB Living   0 0 0 0 0 0   SAB TAB Ectopic Molar Multiple Live Births   0 0 0 0 0 0       PAST MEDICAL HISTORY:   has a past medical history of Depression, Hypothyroidism, Insomnia, Platelet storage pool deficiency [facility]), and Urinary urgency. PAST SURGICAL HISTORY:   has a past surgical history that includes Tympanostomy tube placement (Bilateral); Jenkins tooth extraction; and pr office/outpt visit,procedure only (N/A, 8/10/2018).     ALLERGIES:  Allergies as of 2019 - Review Complete 2019   Allergen Reaction Noted    Keflex [cephalexin] Rash 08/10/2018       MEDICATIONS:  Current Facility-Administered Medications   Medication Dose Route Frequency Provider Last Rate Last Dose    scopolamine (TRANSDERM-SCOP) transdermal patch 1 patch  1 patch Transdermal Once Alexx Torres MD   1 patch at 19 0904    sodium chloride flush 0.9 % injection 10 mL  10 mL Intravenous 2 times per day Sylvia Marcum MD        sodium chloride flush 0.9 % injection 10 mL  10 mL Intravenous PRN Sylvia Marcum, MD        lactated ringers infusion   Intravenous Continuous Simba Crane  mL/hr at 05/02/19 1000      fentaNYL (SUBLIMAZE) injection 25 mcg  25 mcg Intravenous Q5 Min PRN Simba Crane MD        fentaNYL (SUBLIMAZE) injection 50 mcg  50 mcg Intravenous Q5 Min PRN Simba Craen MD           FAMILY HISTORY:  family history includes Breast Cancer in her maternal grandmother; Cancer in her mother and paternal grandmother; Depression in her father; Heart Disease in her father, maternal grandfather, maternal grandmother, and paternal grandmother; Hypertension in her father; Ermalinda Caitlyn in her maternal grandmother; Other in her mother; Stroke in her father. SOCIAL HISTORY:   reports that she has never smoked. She has never used smokeless tobacco. She reports that she drinks alcohol. She reports that she does not use drugs. VITALS:  Vitals:    05/02/19 0833 05/02/19 0859   BP:  116/64   Pulse:  56   Resp:  16   Temp:  98.2 °F (36.8 °C)   TempSrc:  Temporal   SpO2:  100%   Weight: 207 lb 7.3 oz (94.1 kg)    Height: 5' 5\" (1.651 m)                                                                                                                                PHYSICAL EXAM:     Constitutional: She is oriented to person, place, and time. She appears well-developed and well-nourished. HENT:   Head: Normocephalic and atraumatic. Eyes: EOM are normal. Pupils are equal, round, and reactive to light. Neck: Normal range of motion. No tracheal deviation present. Cardiovascular: Normal rate and regular rhythm.    Pulmonary/Chest: Effort normal and breath sounds normal.   Abdomen: soft, nontender  Musculoskeletal: Normal range of motion. She exhibits no deformity. Neurological: She is alert and oriented to person, place, and time. Skin: Skin is warm and dry. Psychiatric: She has a normal mood and affect.  Her behavior is normal. Judgment and thought content normal.        LAB RESULTS:  Admission on 05/02/2019

## 2019-05-02 NOTE — ANESTHESIA PRE PROCEDURE
Department of Anesthesiology  Preprocedure Note       Name:  Keli Menjivar   Age:  19 y.o.  :  2000                                          MRN:  [id number]         Date:  2019      Surgeon: Lynn Carranza):  Clifton Henry MD    Procedure: EXCISION NEXPLANON FROM ARM  C-ARM  (DR YOUSSEF TO ASSIST) (Left )    Medications prior to admission:   Prior to Admission medications    Medication Sig Start Date End Date Taking? Authorizing Provider   desvenlafaxine succinate (PRISTIQ) 50 MG TB24 extended release tablet TAKE 1 TABLET BY MOUTH ONE TIME A DAY 19  Yes Historical Provider, MD   methylphenidate (CONCERTA) 36 MG extended release tablet Take 1 tablet by mouth daily. Per Dr. Jayjay Monge. 19  Yes Historical Provider, MD   thyroid (NP THYROID) 15 MG tablet Take 1 tablet by mouth daily 19  Yes Tyrone Olvera MD   Mirabegron ER (MYRBETRIQ) 50 MG TB24 Take 1 tablet by mouth daily   Yes Historical Provider, MD   lithium (ESKALITH) 450 MG extended release tablet Take 450 mg by mouth 2 times daily   Yes Historical Provider, MD   zolpidem (AMBIEN) 10 MG tablet Take by mouth nightly as needed for Sleep. .   Yes Historical Provider, MD   etonogestrel (NEXPLANON) 68 MG implant 68 mg by Subdermal route once    Historical Provider, MD   NONFORMULARY 1 Dose by Implant route    Historical Provider, MD       Current medications:    Current Facility-Administered Medications   Medication Dose Route Frequency Provider Last Rate Last Dose    scopolamine (TRANSDERM-SCOP) transdermal patch 1 patch  1 patch Transdermal Once Clifton Henry MD   1 patch at 19 0904       Allergies:     Allergies   Allergen Reactions    Keflex [Cephalexin] Rash       Problem List:    Patient Active Problem List   Diagnosis Code    Urge incontinence N39.41    Depression F32.9    Insomnia G47.00    Hypothyroidism E03.9       Past Medical History:        Diagnosis Date    Depression     Hypothyroidism     Insomnia     Platelet storage pool deficiency [facility])     Urinary urgency        Past Surgical History:        Procedure Laterality Date    [place] OFFICE/OUTPT VISIT,PROCEDURE ONLY N/A 8/10/2018    VIDEO URODYAMICS, CYSTOSCOPY performed by Eloina Knight MD [address] Bilateral     WISDOM TOOTH EXTRACTION         Social History:    Social History     Tobacco Use    Smoking status: Never Smoker    Smokeless tobacco: Never Used   Substance Use Topics    Alcohol use: Yes     Comment: occasional                                Counseling given: Not Answered      Vital Signs (Current):   Vitals:    05/02/19 0833 05/02/19 0859   BP:  116/64   Pulse:  56   Resp:  16   Temp:  98.2 °F (36.8 °C)   TempSrc:  Temporal   SpO2:  100%   Weight: 207 lb 7.3 oz (94.1 kg)    Height: 5' 5\" (1.651 m)                                               BP Readings from Last 3 Encounters:   05/02/19 116/64   04/09/19 125/62   02/26/19 112/68       NPO Status: Time of last liquid consumption: 2200                        Time of last solid consumption: 2000                        Date of last liquid consumption: 05/01/19                        Date of last solid food consumption: 05/01/19    BMI:   Wt Readings from Last 3 Encounters:   05/02/19 207 lb 7.3 oz (94.1 kg)   04/09/19 214 lb (97.1 kg)   02/26/19 210 lb (95.3 kg)     Body mass index is 34.52 kg/m².     CBC:   Lab Results   Component Value Date    WBC 9.9 05/02/2019    RBC 4.67 05/02/2019    HGB 13.5 05/02/2019    HCT 43.1 05/02/2019    MCV 92.3 05/02/2019    RDW 12.9 05/02/2019     05/02/2019       CMP:   Lab Results   Component Value Date     01/16/2019    K 4.3 01/16/2019     01/16/2019    CO2 20 01/16/2019    BUN 10 01/16/2019    CREATININE 0.56 01/16/2019    GFRAA >60 01/16/2019    LABGLOM >60 01/16/2019    GLUCOSE 80 01/16/2019    PROT 7.3 01/16/2019    CALCIUM 9.4 01/16/2019    BILITOT 1.14 01/16/2019    ALKPHOS 108 01/16/2019    AST 15 01/16/2019 ALT 10 01/16/2019       POC Tests: No results for input(s): POCGLU, POCNA, POCK, POCCL, POCBUN, POCHEMO, POCHCT in the last 72 hours. Coags: No results found for: PROTIME, INR, APTT    HCG (If Applicable):   Lab Results   Component Value Date    PREGTESTUR NEGATIVE 11/19/2018        ABGs: No results found for: PHART, PO2ART, IQB0RJM, OSM7CMJ, BEART, F3NSAILU     Type & Screen (If Applicable):  No results found for: [facility]    Anesthesia Evaluation  Patient summary reviewed no history of anesthetic complications:   Airway: Mallampati: II  TM distance: >3 FB   Neck ROM: full  Mouth opening: > = 3 FB Dental: normal exam         Pulmonary:Negative Pulmonary ROS and normal exam                               Cardiovascular:Negative CV ROS                      Neuro/Psych:   (+) psychiatric history:            GI/Hepatic/Renal:   (+) morbid obesity          Endo/Other:    (+) hypothyroidism::., .                 Abdominal:           Vascular: negative vascular ROS. Anesthesia Plan      MAC     ASA 2       Induction: intravenous. MIPS: Postoperative opioids intended. Anesthetic plan and risks discussed with patient. Plan discussed with CRNA.                   Renée Langford MD   5/2/2019

## 2019-05-08 ENCOUNTER — OFFICE VISIT (OUTPATIENT)
Dept: OBGYN CLINIC | Age: 19
End: 2019-05-08

## 2019-05-08 VITALS
BODY MASS INDEX: 35.65 KG/M2 | HEART RATE: 68 BPM | SYSTOLIC BLOOD PRESSURE: 125 MMHG | WEIGHT: 214 LBS | DIASTOLIC BLOOD PRESSURE: 86 MMHG | HEIGHT: 65 IN

## 2019-05-08 DIAGNOSIS — Z09 POSTOP CHECK: Primary | ICD-10-CM

## 2019-05-08 PROCEDURE — 99024 POSTOP FOLLOW-UP VISIT: CPT | Performed by: OBSTETRICS & GYNECOLOGY

## 2019-05-08 NOTE — PROGRESS NOTES
[facility]ANS  [facility] - [address] [phone]  Dept: [facility]  2019  1:25 PM      Rosanne Cortés  Procedure: Failed Nexplanon removal      Rosanne Cortés is a 19 y.o. female       The patient was seen, she denies any complaints. She denied any shortness of breath, chest pain or dizziness. She denied any nausea, vomiting, or diarrhea. There is no fever, chills, or rigors. The patient denies any vaginal bleeding, discharge or odor. All of her pre-operative complaints are now resolved. Blood pressure 125/86, pulse 68, height 5' 5\" (1.651 m), weight 214 lb (97.1 kg), last menstrual period 2019, not currently breastfeeding. Abdominal Exam: soft non-tender. Good bowel sounds. No guarding, rebound or rigidity. No costal vertebral angle tenderness bilateral. No hernias    Incision: healing well, no drainage, no erythema    Extremities: No edema or calf pain noted bilaterally. Results for orders placed or performed during the hospital encounter of 19   CBC   Result Value Ref Range    WBC 9.9 4.5 - 13.5 k/uL    RBC 4.67 3.95 - 5.11 m/uL    Hemoglobin 13.5 11.9 - 15.1 g/dL    Hematocrit 43.1 36.3 - 47.1 %    MCV 92.3 82.6 - 102.9 fL    MCH 28.9 25.2 - 33.5 pg    MCHC 31.3 28.4 - 34.8 g/dL    RDW 12.9 11.8 - 14.4 %    Platelets 818 407 - 578 k/uL    MPV 10.9 8.1 - 13.5 fL    NRBC Automated 0.0 0.0 per 100 WBC   POCT urine pregnancy   Result Value Ref Range    HCG, Pregnancy Urine (POC) NEGATIVE NEGATIVE   TYPE AND SCREEN   Result Value Ref Range    Expiration Date 2019     Arm Band Number [id number]     ABO/Rh A POSITIVE     Antibody Screen NEGATIVE            Assessment:      Diagnosis Orders   1.  Postop check          Patient Active Problem List    Diagnosis Date Noted    19 S/P Attempted Nexplanon removal 2019     Unable to retrieve nexplanon      Depression     Insomnia     Hypothyroidism     Urge incontinence 11/05/2018          POD# 13   Procedure: Failed nexplanon removal under Xray guidance   Stable      Plan:  Pt to continue with Nexplanon in her arm  No restrictions. Pt to follow up for annual exam after 19st birthday this summer.      Pamella Mittal MD

## 2019-06-03 ENCOUNTER — TELEPHONE (OUTPATIENT)
Dept: FAMILY MEDICINE CLINIC | Age: 19
End: 2019-06-03

## 2019-06-03 NOTE — TELEPHONE ENCOUNTER
Patient stopped in and form filled out, scanned into chart and given back to her along with her immunization record.

## 2019-06-03 NOTE — TELEPHONE ENCOUNTER
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 I did tell her that since the patient was seen in February of 2019 we will fill it out.  They will drop it off this afternoon and pick it up in the am.

## 2019-09-09 ENCOUNTER — TELEPHONE (OUTPATIENT)
Dept: FAMILY MEDICINE CLINIC | Age: 19
End: 2019-09-09

## 2019-09-09 DIAGNOSIS — Z20.2 STD EXPOSURE: Primary | ICD-10-CM

## 2019-09-10 ENCOUNTER — HOSPITAL ENCOUNTER (OUTPATIENT)
Age: 19
Discharge: HOME | End: 2019-09-10
Payer: COMMERCIAL

## 2019-09-10 DIAGNOSIS — A64 STD (FEMALE): ICD-10-CM

## 2019-09-10 DIAGNOSIS — A64 STD (FEMALE): Primary | ICD-10-CM

## 2019-09-10 PROCEDURE — 87591 N.GONORRHOEAE DNA AMP PROB: CPT

## 2019-09-10 PROCEDURE — 87491 CHLMYD TRACH DNA AMP PROBE: CPT

## 2019-09-11 LAB
C. TRACHOMATIS DNA ,URINE: ABNORMAL
N. GONORRHOEAE DNA, URINE: NEGATIVE
SPECIMEN DESCRIPTION: ABNORMAL

## 2019-09-11 RX ORDER — AZITHROMYCIN 500 MG/1
1000 TABLET, FILM COATED ORAL ONCE
Qty: 2 TABLET | Refills: 0 | Status: SHIPPED | OUTPATIENT
Start: 2019-09-11 | End: 2019-09-11

## 2020-01-09 RX ORDER — LEVOTHYROXINE AND LIOTHYRONINE 9.5; 2.25 UG/1; UG/1
15 TABLET ORAL DAILY
Qty: 30 TABLET | Refills: 11 | Status: SHIPPED | OUTPATIENT
Start: 2020-01-09

## 2020-02-20 ENCOUNTER — NURSE TRIAGE (OUTPATIENT)
Dept: OTHER | Facility: CLINIC | Age: 20
End: 2020-02-20

## 2020-03-18 ENCOUNTER — TELEPHONE (OUTPATIENT)
Dept: OBGYN CLINIC | Age: 20
End: 2020-03-18

## 2020-07-28 ENCOUNTER — TELEPHONE (OUTPATIENT)
Dept: OBGYN CLINIC | Age: 20
End: 2020-07-28

## 2020-07-28 NOTE — TELEPHONE ENCOUNTER
Pt's mom Ignacia called to schedule an appointment for Lakisha Lock to possibly get her 2nd nexplanon removed. They think it it is migrating towards her armpit and are concerned. I scheduled her for the next available apt on 8/28/20 but they would like a call if something opens up before then.

## 2020-08-27 ENCOUNTER — TELEPHONE (OUTPATIENT)
Dept: OBGYN CLINIC | Age: 20
End: 2020-08-27

## 2020-08-27 NOTE — TELEPHONE ENCOUNTER
Pt is scheduled for a possible Nexplanon removal 8/28/20220 but in May 2019 pt had a failed Nexplanon removal in the office and in surgery with xray guided. Inquiring as to what pt is coming for exactly.  Is it just for her annual ?

## 2020-08-28 ENCOUNTER — PROCEDURE VISIT (OUTPATIENT)
Dept: OBGYN CLINIC | Age: 20
End: 2020-08-28
Payer: COMMERCIAL

## 2020-08-28 VITALS
WEIGHT: 136 LBS | SYSTOLIC BLOOD PRESSURE: 118 MMHG | BODY MASS INDEX: 22.66 KG/M2 | DIASTOLIC BLOOD PRESSURE: 76 MMHG | HEART RATE: 88 BPM | HEIGHT: 65 IN

## 2020-08-28 PROCEDURE — 11976 REMOVE CONTRACEPTIVE CAPSULE: CPT | Performed by: OBSTETRICS & GYNECOLOGY

## 2020-08-28 NOTE — PROGRESS NOTES
[facility]YSICIANS  [facility] Aneta Snyder  [place] [address] [facility]  Dept: [phone]  2020      Chief Complaint   Patient presents with    Procedure     Nexplanon removal.  Pt says the old Nexplanon is moving and wants to try to have it removed today       Zachariah Frazier is a 24 yo female who presents for Nexplanon removal.  We had attempted to remove it in the OR, but were unsuccessful, but had placed a new one at that time. She was unable to feel the old Nexplanon previously, but recently lost almost 80 lbs and is now able to feel it in her arm. She would like to have the  one removed. Past Medical History:   Diagnosis Date    Depression     Hypothyroidism     Insomnia     Platelet storage pool deficiency [facility])     Urinary urgency          Past Surgical History:   Procedure Laterality Date    ARM SURGERY Left 2019    ATTEMPTED EXCISION [address] FROM ARM    EXCISION / BIOPSY SKIN LESION OF ARM Left 2019    ATTEMPTED EXCISION NEXPLANON FROM ARM  C-ARM  (DR YOUSSEF TO ASSIST) performed by Stephenie Dow MD at [address] [facility] OFFICE/OUTPT [address] [facility] N/A 8/10/2018    VIDEO Clovis Estrella, CYSTOSCOPY performed by Bob Figueroa MD at [address] [facility] Bilateral     WISDOM TOOTH EXTRACTION         Family History   Problem Relation Age of Onset    Other Mother         lime desease    Cancer Mother         skin    Depression Father     Stroke Father     Heart Disease Father     Hypertension Father     Heart Disease Maternal Grandmother     Breast Cancer Maternal Grandmother     Lung Cancer Maternal Grandmother     Heart Disease Maternal Grandfather     Heart Disease Paternal Grandmother     Cancer Paternal Grandmother         cancer       Social History     Tobacco Use    Smoking status: Never Smoker    Smokeless tobacco: Never Used   Substance Use Topics    Alcohol use: Yes     Comment: occasional    Drug use:  No Current Outpatient Medications on File Prior to Visit   Medication Sig Dispense Refill    etonogestrel (NEXPLANON) 68 MG implant 68 mg by Subdermal route once      zolpidem (AMBIEN) 10 MG tablet Take by mouth nightly as needed for Sleep. Brian Suzan thyroid (NP THYROID) 15 MG tablet Take 1 tablet by mouth daily (Patient not taking: Reported on 2020) 30 tablet 11    desvenlafaxine succinate (PRISTIQ) 50 MG TB24 extended release tablet TAKE 1 TABLET BY MOUTH ONE TIME A DAY  4    methylphenidate (CONCERTA) 36 MG extended release tablet Take 1 tablet by mouth daily. Per Dr. Rc Villeda. 0    Mirabegron ER (MYRBETRIQ) 50 MG TB24 Take 1 tablet by mouth daily      lithium (ESKALITH) 450 MG extended release tablet Take 450 mg by mouth 2 times daily      NONFORMULARY 1 Dose by Implant route       No current facility-administered medications on file prior to visit. Allergies as of 2020 - Review Complete 2019   Allergen Reaction Noted    Keflex [cephalexin] Rash 08/10/2018         OB History    Para Term  AB Living   0 0 0 0 0 0   SAB TAB Ectopic Molar Multiple Live Births   0 0 0 0 0 0         Blood pressure 118/76, pulse 88, height 5' 5\" (1.651 m), weight 136 lb (61.7 kg), not currently breastfeeding. PROCEDURE:  The patient was positioned comfortably on our procedure table. She was consented earlier in the appointment and the procedure risk and complications were reviewed. A sterile prep and drape was completed and a 1% xylocaine for local anesthetic was utilized, 2 ml. A scalpel was used to make a 0.5 cm incision. The device was removed, but it was difficult to extract. Skin glue was applied with a pressure wrap covering it. The patient tolerated the procedure well. Formal restrictions were discussed in detail. She is to notify our office if any swelling, redness, temperature, or limb restriction or numbness. Assessment:   Diagnosis Orders   1.  Encounter for Nexplanon removal       Patient Active Problem List    Diagnosis Date Noted    5/2/19 S/P Attempted Nexplanon removal 05/02/2019     Unable to retrieve nexplanon      Depression     Insomnia     Hypothyroidism     Urge incontinence 11/05/2018         Plan:  Return for annual exam.  Barrier recommendations given    Pt to follow up for an annual exam  Art Fontenot MD

## 2022-03-24 ENCOUNTER — HOSPITAL ENCOUNTER (OUTPATIENT)
Age: 22
Setting detail: SPECIMEN
Discharge: HOME | End: 2022-03-24

## 2022-03-24 LAB
ABSOLUTE EOS #: 0.07 K/UL (ref 0–0.44)
ABSOLUTE IMMATURE GRANULOCYTE: <0.03 K/UL (ref 0–0.3)
ABSOLUTE LYMPH #: 1.13 K/UL (ref 1.1–3.7)
ABSOLUTE MONO #: 0.6 K/UL (ref 0.1–1.2)
ALBUMIN SERPL-MCNC: 4.6 G/DL (ref 3.5–5.2)
ALBUMIN/GLOBULIN RATIO: 1.7 (ref 1–2.5)
ALP BLD-CCNC: 73 U/L (ref 35–104)
ALT SERPL-CCNC: 9 U/L (ref 5–33)
AST SERPL-CCNC: 16 U/L
BASOPHILS # BLD: 2 % (ref 0–2)
BASOPHILS ABSOLUTE: 0.06 K/UL (ref 0–0.2)
BILIRUB SERPL-MCNC: 1.02 MG/DL (ref 0.3–1.2)
BILIRUBIN DIRECT: 0.22 MG/DL
BILIRUBIN, INDIRECT: 0.8 MG/DL (ref 0–1)
CHOLESTEROL, FASTING: 149 MG/DL
CHOLESTEROL/HDL RATIO: 2.4
EOSINOPHILS RELATIVE PERCENT: 2 % (ref 1–4)
HCG QUANTITATIVE: <1 IU/L
HCT VFR BLD CALC: 43.8 % (ref 36.3–47.1)
HDLC SERPL-MCNC: 62 MG/DL
HEMOGLOBIN: 13.6 G/DL (ref 11.9–15.1)
IMMATURE GRANULOCYTES: 0 %
LDL CHOLESTEROL: 78 MG/DL (ref 0–130)
LYMPHOCYTES # BLD: 31 % (ref 24–43)
MCH RBC QN AUTO: 29.4 PG (ref 25.2–33.5)
MCHC RBC AUTO-ENTMCNC: 31.1 G/DL (ref 28.4–34.8)
MCV RBC AUTO: 94.6 FL (ref 82.6–102.9)
MONOCYTES # BLD: 17 % (ref 3–12)
NRBC AUTOMATED: 0 PER 100 WBC
PDW BLD-RTO: 12.9 % (ref 11.8–14.4)
PLATELET # BLD: 215 K/UL (ref 138–453)
PMV BLD AUTO: 11.7 FL (ref 8.1–13.5)
RBC # BLD: 4.63 M/UL (ref 3.95–5.11)
SEG NEUTROPHILS: 48 % (ref 36–65)
SEGMENTED NEUTROPHILS ABSOLUTE COUNT: 1.74 K/UL (ref 1.5–8.1)
TOTAL PROTEIN: 7.3 G/DL (ref 6.4–8.3)
TRIGLYCERIDE, FASTING: 45 MG/DL
WBC # BLD: 3.6 K/UL (ref 3.5–11.3)

## 2022-04-22 ENCOUNTER — HOSPITAL ENCOUNTER (OUTPATIENT)
Age: 22
Setting detail: SPECIMEN
Discharge: HOME | End: 2022-04-22

## 2022-04-22 LAB
ABSOLUTE EOS #: 0.08 K/UL (ref 0–0.44)
ABSOLUTE IMMATURE GRANULOCYTE: <0.03 K/UL (ref 0–0.3)
ABSOLUTE LYMPH #: 0.78 K/UL (ref 1.1–3.7)
ABSOLUTE MONO #: 0.64 K/UL (ref 0.1–1.2)
ALBUMIN SERPL-MCNC: 4.7 G/DL (ref 3.5–5.2)
ALBUMIN/GLOBULIN RATIO: 1.8 (ref 1–2.5)
ALP BLD-CCNC: 81 U/L (ref 35–104)
ALT SERPL-CCNC: 12 U/L (ref 5–33)
AST SERPL-CCNC: 18 U/L
BASOPHILS # BLD: 1 % (ref 0–2)
BASOPHILS ABSOLUTE: 0.04 K/UL (ref 0–0.2)
BILIRUB SERPL-MCNC: 1.14 MG/DL (ref 0.3–1.2)
BILIRUBIN DIRECT: 0.22 MG/DL
BILIRUBIN, INDIRECT: 0.92 MG/DL (ref 0–1)
CHOLESTEROL/HDL RATIO: 2.7
CHOLESTEROL: 167 MG/DL
EOSINOPHILS RELATIVE PERCENT: 2 % (ref 1–4)
HCG QUANTITATIVE: <1 MIU/ML
HCT VFR BLD CALC: 41.6 % (ref 36.3–47.1)
HDLC SERPL-MCNC: 61 MG/DL
HEMOGLOBIN: 13.3 G/DL (ref 11.9–15.1)
IMMATURE GRANULOCYTES: 0 %
LDL CHOLESTEROL: 96 MG/DL (ref 0–130)
LYMPHOCYTES # BLD: 16 % (ref 24–43)
MCH RBC QN AUTO: 29.5 PG (ref 25.2–33.5)
MCHC RBC AUTO-ENTMCNC: 32 G/DL (ref 28.4–34.8)
MCV RBC AUTO: 92.2 FL (ref 82.6–102.9)
MONOCYTES # BLD: 13 % (ref 3–12)
NRBC AUTOMATED: 0 PER 100 WBC
PDW BLD-RTO: 12.9 % (ref 11.8–14.4)
PLATELET # BLD: 228 K/UL (ref 138–453)
PMV BLD AUTO: 11.2 FL (ref 8.1–13.5)
RBC # BLD: 4.51 M/UL (ref 3.95–5.11)
SEG NEUTROPHILS: 68 % (ref 36–65)
SEGMENTED NEUTROPHILS ABSOLUTE COUNT: 3.34 K/UL (ref 1.5–8.1)
TOTAL PROTEIN: 7.3 G/DL (ref 6.4–8.3)
TRIGL SERPL-MCNC: 51 MG/DL
WBC # BLD: 4.9 K/UL (ref 3.5–11.3)

## 2022-05-25 ENCOUNTER — HOSPITAL ENCOUNTER (OUTPATIENT)
Age: 22
Setting detail: SPECIMEN
Discharge: HOME | End: 2022-05-25

## 2022-05-25 LAB
ABSOLUTE EOS #: 0.13 K/UL (ref 0–0.44)
ABSOLUTE IMMATURE GRANULOCYTE: <0.03 K/UL (ref 0–0.3)
ABSOLUTE LYMPH #: 1.93 K/UL (ref 1.1–3.7)
ABSOLUTE MONO #: 0.31 K/UL (ref 0.1–1.2)
ALBUMIN SERPL-MCNC: 4.7 G/DL (ref 3.5–5.2)
ALBUMIN/GLOBULIN RATIO: 1.6 (ref 1–2.5)
ALP BLD-CCNC: 77 U/L (ref 35–104)
ALT SERPL-CCNC: 13 U/L (ref 5–33)
AST SERPL-CCNC: 22 U/L
BASOPHILS # BLD: 1 % (ref 0–2)
BASOPHILS ABSOLUTE: 0.05 K/UL (ref 0–0.2)
BILIRUB SERPL-MCNC: 0.93 MG/DL (ref 0.3–1.2)
BILIRUBIN DIRECT: 0.15 MG/DL
BILIRUBIN, INDIRECT: 0.78 MG/DL (ref 0–1)
CHOLESTEROL, FASTING: 217 MG/DL
CHOLESTEROL/HDL RATIO: 3.6
EOSINOPHILS RELATIVE PERCENT: 3 % (ref 1–4)
HCG QUANTITATIVE: <1 MIU/ML
HCT VFR BLD CALC: 41.7 % (ref 36.3–47.1)
HDLC SERPL-MCNC: 60 MG/DL
HEMOGLOBIN: 13.6 G/DL (ref 11.9–15.1)
IMMATURE GRANULOCYTES: 0 %
LDL CHOLESTEROL: 138 MG/DL (ref 0–130)
LYMPHOCYTES # BLD: 50 % (ref 24–43)
MCH RBC QN AUTO: 29.7 PG (ref 25.2–33.5)
MCHC RBC AUTO-ENTMCNC: 32.6 G/DL (ref 28.4–34.8)
MCV RBC AUTO: 91 FL (ref 82.6–102.9)
MONOCYTES # BLD: 8 % (ref 3–12)
NRBC AUTOMATED: 0 PER 100 WBC
PDW BLD-RTO: 13.2 % (ref 11.8–14.4)
PLATELET # BLD: 247 K/UL (ref 138–453)
PMV BLD AUTO: 11.6 FL (ref 8.1–13.5)
RBC # BLD: 4.58 M/UL (ref 3.95–5.11)
SEG NEUTROPHILS: 38 % (ref 36–65)
SEGMENTED NEUTROPHILS ABSOLUTE COUNT: 1.49 K/UL (ref 1.5–8.1)
TOTAL PROTEIN: 7.6 G/DL (ref 6.4–8.3)
TRIGLYCERIDE, FASTING: 93 MG/DL
WBC # BLD: 3.9 K/UL (ref 3.5–11.3)

## 2023-10-18 NOTE — PROGRESS NOTES
-- DO NOT REPLY / DO NOT REPLY ALL --  -- Message is from [facility] [facility] [employer] ([facility]) --    Offered Waitlist if Available for the Visit Type? No    Caller is requesting an appointment - at a sooner time than what was available.      PCP unavailable for post-hospital follow up    Reason for Visit: Hospital follow-up, discharged 10/17/23. shortness of breathe and bronchitis    Is the patient currently scheduled? No    Preferred time to be seen: Anytime    Caller Information       Type Contact Phone/Fax    10/18/2023 01:02 PM CDT Phone (Incoming) Zahira Kat (Self) [phone] (H)          Alternative phone number: No    Can a detailed message be left? Yes    Message Turnaround: [place]:    Refer to site's KB page for routing instructions    Please give this turnaround time to the caller:   \"You can expect to receive a response 1-3 business days after your provider's clinical team reviews the message\"   [facility] PH[facility]ICIANS  [facility] [address] Rodriguez  [address] [address]  Dept: [phone]  19      Chief Complaint   Patient presents with   [facility] Establish Care    Procedure     Nexplanon removal and insertion of new one          Past Medical History:   Diagnosis Date    Depression     Hypothyroidism     Insomnia     Platelet storage pool deficiency [facility])     Urinary urgency          Past Surgical History:   Procedure Laterality Date    [place] OFFICE/OUTPT VISIT,PROCEDURE ONLY N/A 8/10/2018    VIDEO Real Frizzle, CYSTOSCOPY performed by Eloina Knight MD at [address] [facility] TYMPANOSTOMY TUBE PLACEMENT Bilateral     WISDOM TOOTH EXTRACTION         Family History   Problem Relation Age of Onset    Other Mother         lime desease    Cancer Mother         skin    Depression Father     Stroke Father     Heart Disease Father     Hypertension Father     Heart Disease Maternal Grandmother     Breast Cancer Maternal Grandmother     Lung Cancer Maternal Grandmother     Heart Disease Maternal Grandfather     Heart Disease Paternal Grandmother     Cancer Paternal Grandmother         cancer       Social History     Tobacco Use    Smoking status: Never Smoker    Smokeless tobacco: Never Used   Substance Use Topics    Alcohol use: Yes     Comment: occasional    Drug use: No       Current Outpatient Medications on File Prior to Visit   Medication Sig Dispense Refill    methylphenidate (CONCERTA) 36 MG extended release tablet Take 1 tablet by mouth daily. Per Dr. Conner Arizmendi. 0    thyroid (NP THYROID) 15 MG tablet Take 1 tablet by mouth daily 30 tablet 11    Mirabegron ER (MYRBETRIQ) 50 MG TB24 Take 1 tablet by mouth daily      lithium (ESKALITH) 450 MG extended release tablet Take 450 mg by mouth 2 times daily      etonogestrel (NEXPLANON) 68 MG implant 68 mg by Subdermal route once      zolpidem (AMBIEN) 10 MG tablet Take by mouth nightly as needed for Sleep. Mike Aristeo NONFORMULARY 1 MD

## 2024-06-19 LAB — PAP SMEAR, EXTERNAL: NORMAL

## 2025-02-12 ENCOUNTER — OFFICE VISIT (OUTPATIENT)
Dept: FAMILY MEDICINE CLINIC | Age: 25
End: 2025-02-12

## 2025-02-12 VITALS
OXYGEN SATURATION: 98 % | BODY MASS INDEX: 34.82 KG/M2 | RESPIRATION RATE: 16 BRPM | DIASTOLIC BLOOD PRESSURE: 68 MMHG | HEIGHT: 65 IN | SYSTOLIC BLOOD PRESSURE: 104 MMHG | WEIGHT: 209 LBS | HEART RATE: 70 BPM | TEMPERATURE: 97.9 F

## 2025-02-12 DIAGNOSIS — E03.9 HYPOTHYROIDISM, UNSPECIFIED TYPE: Primary | ICD-10-CM

## 2025-02-12 DIAGNOSIS — Z3A.33 33 WEEKS GESTATION OF PREGNANCY: ICD-10-CM

## 2025-02-12 DIAGNOSIS — G47.00 INSOMNIA, UNSPECIFIED TYPE: ICD-10-CM

## 2025-02-12 DIAGNOSIS — N39.41 URGE INCONTINENCE: ICD-10-CM

## 2025-02-12 DIAGNOSIS — F33.2 SEVERE EPISODE OF RECURRENT MAJOR DEPRESSIVE DISORDER, WITHOUT PSYCHOTIC FEATURES (HCC): ICD-10-CM

## 2025-02-12 RX ORDER — QUETIAPINE FUMARATE 50 MG/1
50 TABLET, FILM COATED ORAL NIGHTLY
COMMUNITY

## 2025-02-12 RX ORDER — SERTRALINE HYDROCHLORIDE 100 MG/1
100 TABLET, FILM COATED ORAL DAILY
COMMUNITY
Start: 2025-02-11

## 2025-02-12 RX ORDER — VITAMIN A ACETATE, .BETA.-CAROTENE, ASCORBIC ACID, CHOLECALCIFEROL, .ALPHA.-TOCOPHEROL ACETATE, DL-, THIAMINE MONONITRATE, RIBOFLAVIN, NIACINAMIDE, PYRIDOXINE HYDROCHLORIDE, FOLIC ACID, CYANOCOBALAMIN, CALCIUM CARBONATE, FERROUS FUMARATE, ZINC OXIDE, AND CUPRIC OXIDE 2000; 2000; 120; 400; 22; 1.84; 3; 20; 10; 1; 12; 200; 27; 25; 2 [IU]/1; [IU]/1; MG/1; [IU]/1; MG/1; MG/1; MG/1; MG/1; MG/1; MG/1; UG/1; MG/1; MG/1; MG/1; MG/1
1 TABLET ORAL DAILY
COMMUNITY

## 2025-02-12 SDOH — ECONOMIC STABILITY: FOOD INSECURITY: WITHIN THE PAST 12 MONTHS, YOU WORRIED THAT YOUR FOOD WOULD RUN OUT BEFORE YOU GOT MONEY TO BUY MORE.: NEVER TRUE

## 2025-02-12 SDOH — ECONOMIC STABILITY: FOOD INSECURITY: WITHIN THE PAST 12 MONTHS, THE FOOD YOU BOUGHT JUST DIDN'T LAST AND YOU DIDN'T HAVE MONEY TO GET MORE.: NEVER TRUE

## 2025-02-12 ASSESSMENT — PATIENT HEALTH QUESTIONNAIRE - PHQ9
1. LITTLE INTEREST OR PLEASURE IN DOING THINGS: MORE THAN HALF THE DAYS
SUM OF ALL RESPONSES TO PHQ QUESTIONS 1-9: 7
8. MOVING OR SPEAKING SO SLOWLY THAT OTHER PEOPLE COULD HAVE NOTICED. OR THE OPPOSITE, BEING SO FIGETY OR RESTLESS THAT YOU HAVE BEEN MOVING AROUND A LOT MORE THAN USUAL: NOT AT ALL
9. THOUGHTS THAT YOU WOULD BE BETTER OFF DEAD, OR OF HURTING YOURSELF: NOT AT ALL
5. POOR APPETITE OR OVEREATING: NOT AT ALL
SUM OF ALL RESPONSES TO PHQ QUESTIONS 1-9: 7
10. IF YOU CHECKED OFF ANY PROBLEMS, HOW DIFFICULT HAVE THESE PROBLEMS MADE IT FOR YOU TO DO YOUR WORK, TAKE CARE OF THINGS AT HOME, OR GET ALONG WITH OTHER PEOPLE: SOMEWHAT DIFFICULT
3. TROUBLE FALLING OR STAYING ASLEEP: SEVERAL DAYS
6. FEELING BAD ABOUT YOURSELF - OR THAT YOU ARE A FAILURE OR HAVE LET YOURSELF OR YOUR FAMILY DOWN: SEVERAL DAYS
7. TROUBLE CONCENTRATING ON THINGS, SUCH AS READING THE NEWSPAPER OR WATCHING TELEVISION: SEVERAL DAYS
2. FEELING DOWN, DEPRESSED OR HOPELESS: SEVERAL DAYS
SUM OF ALL RESPONSES TO PHQ QUESTIONS 1-9: 7
4. FEELING TIRED OR HAVING LITTLE ENERGY: SEVERAL DAYS
SUM OF ALL RESPONSES TO PHQ9 QUESTIONS 1 & 2: 3
SUM OF ALL RESPONSES TO PHQ QUESTIONS 1-9: 7

## 2025-02-12 ASSESSMENT — ENCOUNTER SYMPTOMS
ABDOMINAL DISTENTION: 0
CHEST TIGHTNESS: 0
COUGH: 0
SORE THROAT: 0
ABDOMINAL PAIN: 0
RHINORRHEA: 0
VOMITING: 0
NAUSEA: 0
BACK PAIN: 0
SINUS PAIN: 0
DIARRHEA: 0
SHORTNESS OF BREATH: 0
CONSTIPATION: 0

## 2025-05-15 ENCOUNTER — TELEPHONE (OUTPATIENT)
Dept: FAMILY MEDICINE CLINIC | Age: 25
End: 2025-05-15

## 2025-05-15 NOTE — TELEPHONE ENCOUNTER
Patient has been experiencing left hip pain for the last week, and is requesting an appointment with PCP. Writer did offer the patient first available (late June), but the patient feels she needs seen before then. Please advise.

## 2025-05-23 ENCOUNTER — OFFICE VISIT (OUTPATIENT)
Dept: FAMILY MEDICINE CLINIC | Age: 25
End: 2025-05-23
Payer: MEDICARE

## 2025-05-23 ENCOUNTER — HOSPITAL ENCOUNTER (OUTPATIENT)
Dept: GENERAL RADIOLOGY | Age: 25
Discharge: HOME | End: 2025-05-25
Payer: MEDICARE

## 2025-05-23 ENCOUNTER — HOSPITAL ENCOUNTER (OUTPATIENT)
Age: 25
Discharge: HOME | End: 2025-05-25
Payer: MEDICARE

## 2025-05-23 VITALS
TEMPERATURE: 97.2 F | HEART RATE: 72 BPM | WEIGHT: 208 LBS | OXYGEN SATURATION: 98 % | RESPIRATION RATE: 16 BRPM | SYSTOLIC BLOOD PRESSURE: 92 MMHG | BODY MASS INDEX: 34.24 KG/M2 | DIASTOLIC BLOOD PRESSURE: 60 MMHG

## 2025-05-23 DIAGNOSIS — M25.552 LEFT HIP PAIN: Primary | ICD-10-CM

## 2025-05-23 DIAGNOSIS — M25.552 LEFT HIP PAIN: ICD-10-CM

## 2025-05-23 PROCEDURE — 73502 X-RAY EXAM HIP UNI 2-3 VIEWS: CPT

## 2025-05-23 PROCEDURE — 1036F TOBACCO NON-USER: CPT | Performed by: FAMILY MEDICINE

## 2025-05-23 PROCEDURE — G8427 DOCREV CUR MEDS BY ELIG CLIN: HCPCS | Performed by: FAMILY MEDICINE

## 2025-05-23 PROCEDURE — 99213 OFFICE O/P EST LOW 20 MIN: CPT | Performed by: FAMILY MEDICINE

## 2025-05-23 PROCEDURE — G8419 CALC BMI OUT NRM PARAM NOF/U: HCPCS | Performed by: FAMILY MEDICINE

## 2025-05-23 RX ORDER — QUETIAPINE FUMARATE 50 MG/1
50 TABLET, EXTENDED RELEASE ORAL EVERY EVENING
COMMUNITY
Start: 2025-05-07

## 2025-05-23 RX ORDER — LEVONORGESTREL 52 MG/1
1 INTRAUTERINE DEVICE INTRAUTERINE ONCE
COMMUNITY

## 2025-05-23 RX ORDER — MODAFINIL 200 MG/1
200 TABLET ORAL DAILY PRN
COMMUNITY

## 2025-05-23 ASSESSMENT — PATIENT HEALTH QUESTIONNAIRE - PHQ9
2. FEELING DOWN, DEPRESSED OR HOPELESS: SEVERAL DAYS
SUM OF ALL RESPONSES TO PHQ QUESTIONS 1-9: 1
1. LITTLE INTEREST OR PLEASURE IN DOING THINGS: NOT AT ALL
SUM OF ALL RESPONSES TO PHQ QUESTIONS 1-9: 1

## 2025-05-23 ASSESSMENT — ENCOUNTER SYMPTOMS
ABDOMINAL DISTENTION: 0
BACK PAIN: 0
COUGH: 0
RHINORRHEA: 0
SINUS PAIN: 0
CHEST TIGHTNESS: 0
SORE THROAT: 0
ABDOMINAL PAIN: 0
CONSTIPATION: 0
NAUSEA: 0
DIARRHEA: 0
VOMITING: 0
SHORTNESS OF BREATH: 0

## 2025-05-23 NOTE — PATIENT INSTRUCTIONS
Patient Education        Hip Bursitis: Exercises  Introduction  Here are some examples of exercises for you to try. The exercises may be suggested for a condition or for rehabilitation. Start each exercise slowly. Ease off the exercises if you start to have pain.  You will be told when to start these exercises and which ones will work best for you.  How to do the exercises  Hip external rotator stretch    Lie on your back with both knees bent and your feet flat on the floor.  Put the ankle of your affected leg on your opposite thigh near your knee.  Use your opposite hand to gently pull your knee across your body toward your shoulder. For example, to stretch your left hip, use your right hand to pull your left knee toward your right shoulder.  Hold the stretch for 15 to 30 seconds.  Repeat 2 to 4 times.  It's a good idea to repeat these steps with your other leg.  Iliotibial band stretch    Stand a few inches from a wall, with your affected hip toward the wall. If you are not steady on your feet, hold on to a chair or counter.  Stand on the leg with the affected hip. Then cross your other leg in front of it.  Let your affected hip drop out to the side of your body and against the wall. Then lean away from your affected hip until you feel a stretch. You can take the arm that's against the wall and raise it over your head as you lean away from your hip.  Hold the stretch for 15 to 30 seconds.  Repeat 2 to 4 times.  It's a good idea to repeat these steps with your other hip.  Hip abduction (lying on side)    Lie on your side, with your affected leg on top. You can use your hand or a pillow to support your head.  Keep your knee straight and your leg in a straight line with your body.  Lift your affected leg straight up toward the ceiling, about 12 inches off the floor. Hold for about 6 seconds, then slowly lower your leg.  Repeat 8 to 12 times.  It's a good idea to repeat these steps on your other side.  Keep your

## 2025-05-23 NOTE — PROGRESS NOTES
Faith Galo MD  [facility]  [facility]  [address], [address]  [facility] [address]  Dept: [phone]  Dept Fax: [phone]     Patient ID: Helene Rubin is a 24 y.o. female.    History of Present Illness  The patient is a 24-year-old white female presenting with acute left hip pain. She reports intermittent pain similar to sciatica, severe enough to cause occasional limping and radiating into the groin. The pain has worsened recently, particularly post-childbirth 2 months ago. Ibuprofen provides some relief. She has a history of childhood hip dysplasia requiring a harness.    Otherwise pt doing well on current tx and no other concerns today.     The patient's past medical, surgical, social, and family history as well as his current medications and allergies were reviewed as documented in today's encounter.      My previous office notes, consult notes, labs and diagnostic studies were reviewed prior to and during encounter.    Current Outpatient Medications on File Prior to Visit   Medication Sig Dispense Refill    modafinil (PROVIGIL) 200 MG tablet Take 1 tablet by mouth daily as needed.      QUEtiapine (SEROQUEL XR) 50 MG extended release tablet Take 1 tablet by mouth every evening      levonorgestrel (MIRENA, 52 MG,) IUD 52 mg 1 each by IntraUTERine route once      Prenatal Vit-Fe Fumarate-FA (PNV PRENATAL PLUS MULTIVITAMIN) 27-1 MG TABS Take 1 tablet by mouth daily      sertraline (ZOLOFT) 100 MG tablet Take 1 tablet by mouth daily      zolpidem (AMBIEN) 10 MG tablet Take by mouth nightly as needed for Sleep..       No current facility-administered medications on file prior to visit.        Subjective:     Review of Systems   Constitutional:  Negative for appetite change, chills, fatigue and fever.   HENT:  Negative for congestion, ear pain, rhinorrhea, sinus pain and sore throat.    Eyes:  Negative for visual disturbance.   Respiratory:  Negative

## 2025-05-26 ENCOUNTER — RESULTS FOLLOW-UP (OUTPATIENT)
Dept: FAMILY MEDICINE CLINIC | Age: 25
End: 2025-05-26

## 2025-06-02 ENCOUNTER — HOSPITAL ENCOUNTER (OUTPATIENT)
Dept: PHYSICAL THERAPY | Facility: CLINIC | Age: 25
Setting detail: THERAPIES SERIES
Discharge: HOME | End: 2025-06-02
Payer: MEDICARE

## 2025-06-02 PROCEDURE — 97162 PT EVAL MOD COMPLEX 30 MIN: CPT

## 2025-06-02 PROCEDURE — 97110 THERAPEUTIC EXERCISES: CPT

## 2025-06-02 NOTE — CONSULTS
[] [facility]  Outpatient Rehabilitation &  Therapy  [address]  [phone]  [phone] [] [facility]  Outpatient Rehabilitation &  Therapy  [address]  P: [phone]  F: [phone] [] [facility]  Outpatient Rehabilitation &  Therapy  [address]  P: [phone]  F: [phone] [x] [facility]  Outpatient Rehabilitation &  Therapy  [address]  [phone]  [phone] [] [facility]  Outpatient Rehabilitation &  Therapy  [address]   P: [phone]  F: [phone]  [] [facility]  Outpatient Rehabilitation &  Therapy  [address]  P: [phone]  F: [phone] [] [facility]  Outpatient Rehabilitation &  Therapy  [address]  P: [phone]  F: [phone] [] [facility]  Outpatient Rehabilitation &  Therapy  [address]  P: [phone]  F: [phone] [] [facility]  Outpatient Rehabilitation &  Therapy  [address]  P: [phone]  F: [phone]  [] [facility] Outpatient Rehabilitation &  Therapy  [address]  P: [phone]  F: [phone]     Physical Therapy Lower Extremity Evaluation    Date:  2025  Patient: Helene Rubin  : 2000  MRN: [id number]  Physician: Faith Galo MD      Insurance: 1.Medicare; Rob yr; vs based on med nec; no auth req; 20% coins; ded met; no OOP limit  2.[place]; [place]; follows primary; no pt resp due   Medical Diagnosis: Left hip pain [M25.552]     Rehab Codes: Left hip pain [M25.552]   Onset date: 25    Next Dr's appt.: Pending    Subjective:   CC: Left hip anterior hip pain.  HPI: The patient has a chief complaint of left anterior hip pain.  She attributes this pain to trying to begin abdominal strengthening following the

## 2025-06-05 NOTE — CARE COORDINATION
[] [facility]  Outpatient Rehabilitation &  Therapy  [address]  [phone]  [phone] [] [facility]  Outpatient Rehabilitation &  Therapy  [address]  P: [phone]  F: [phone] [] [facility]  Outpatient Rehabilitation &  Therapy  [address]  P: [phone]  F: [phone] [x] [facility]  Outpatient Rehabilitation &  Therapy  [address]  [phone]  [phone] [] [facility]  Outpatient Rehabilitation &  Therapy  [address]   P: [phone]  F: [phone]  [] [facility]  Outpatient Rehabilitation &  Therapy  [address]  P: [phone]  F: [phone] [] [facility]  Outpatient Rehabilitation &  Therapy  [address]  P: [phone]  F: [phone] [] [facility]  Outpatient Rehabilitation &  Therapy  [address]  P: [phone]  F: [phone] [] [facility]  Outpatient Rehabilitation &  Therapy  [address]  P: [phone]  F: [phone]  [] [facility] Outpatient Rehabilitation &  Therapy  [address]  P: [phone]  F: [phone]     THERAPY RESPONSIBILITY OF CARE TRANSFER FORM       PATIENT NAME: Helene Rubin  MRN: [id number]   : 2000      TRANSFERRING FACILITY:    [] [place]   [] [facility]   [] [place]   [] [facility]   [] [facility] [] [place]   [] [facility]  [x] [facility]  [] [facility]   [] [place]  [] [place]   [] Other:          ACCEPTING FACILITY  [] [place]   [] [facility]   [] [place]   [] [facility]   [] [facility] [] [place]   [] [facility]  [] [facility]  [x] [facility]   [] [place]  [] [place]   [] Other:         REASON FOR TRANSFER: Patient would benefit from

## (undated) DEVICE — CHLORAPREP 26ML ORANGE

## (undated) DEVICE — GLOVE SURG SZ 6 THK91MIL LTX FREE SYN POLYISOPRENE ANTI

## (undated) DEVICE — CATH SINGLE SENSOR COUDE TIP

## (undated) DEVICE — CATHETER URODYN AIR CHARGED ABD SENSOR

## (undated) DEVICE — SVMMC PEDS/UROLOGY MINOR PACK: Brand: MEDLINE INDUSTRIES, INC.

## (undated) DEVICE — SKIN AFFIX SURG ADHESIVE 72/CS 0.55ML: Brand: MEDLINE

## (undated) DEVICE — DRAPE,REIN 53X77,STERILE: Brand: MEDLINE

## (undated) DEVICE — DRAPE C ARM UNIV W41XL74IN CLR PLAS XR VELC CLSR POLY STRP

## (undated) DEVICE — DUP USE 240185 SOLUTION IV IRRIG WATER 1000ML IRRIG BAG 2B7114

## (undated) DEVICE — GLOVE SURG SZ 65 THK91MIL LTX FREE SYN POLYISOPRENE

## (undated) DEVICE — GLOVE ORANGE PI 7   MSG9070

## (undated) DEVICE — TOWEL,OR,DSP,ST,NATURAL,DLX,4/PK,20PK/CS: Brand: MEDLINE

## (undated) DEVICE — GOWN,AURORA,NONRNF,XL,30/CS: Brand: MEDLINE

## (undated) DEVICE — Device: Brand: PUMP TUBING INFUSION LINE

## (undated) DEVICE — E-Z CLEAN, NON-STICK, PTFE COATED, ELECTROSURGICAL NEEDLE ELECTRODE, MODIFIED EXTENDED INSULATION, 2.75 INCH (7 CM): Brand: MEGADYNE

## (undated) DEVICE — ELECTRODE EMG GEL PTCH W/ WIRE NEOTRODE II URODYN

## (undated) DEVICE — GLOVE ORANGE PI 7 1/2   MSG9075